# Patient Record
Sex: FEMALE | Race: BLACK OR AFRICAN AMERICAN | NOT HISPANIC OR LATINO | Employment: FULL TIME | URBAN - METROPOLITAN AREA
[De-identification: names, ages, dates, MRNs, and addresses within clinical notes are randomized per-mention and may not be internally consistent; named-entity substitution may affect disease eponyms.]

---

## 2017-08-18 LAB — POC BETA-HCG (QUAL): NEGATIVE

## 2021-04-21 ENCOUNTER — HISTORICAL (OUTPATIENT)
Dept: ADMINISTRATIVE | Facility: HOSPITAL | Age: 46
End: 2021-04-21

## 2022-04-07 ENCOUNTER — HISTORICAL (OUTPATIENT)
Dept: ADMINISTRATIVE | Facility: HOSPITAL | Age: 47
End: 2022-04-07
Payer: COMMERCIAL

## 2022-04-24 VITALS
OXYGEN SATURATION: 99 % | WEIGHT: 186.31 LBS | SYSTOLIC BLOOD PRESSURE: 136 MMHG | HEIGHT: 65 IN | BODY MASS INDEX: 31.04 KG/M2 | DIASTOLIC BLOOD PRESSURE: 87 MMHG

## 2022-05-11 ENCOUNTER — TELEPHONE (OUTPATIENT)
Dept: GYNECOLOGY | Facility: CLINIC | Age: 47
End: 2022-05-11
Payer: COMMERCIAL

## 2022-05-11 NOTE — TELEPHONE ENCOUNTER
Please schedule this patient for an earlier appointment regarding persistent vaginal bleeding. Thank you.       ----- Message from Grace Hagan MD sent at 5/10/2022  7:20 PM CDT -----  Regarding: FW: Questions and concers  Contact: Lavern Banegas  Please schedule this patient for an earlier appointment regarding persistent vaginal bleeding  ----- Message -----  From: Holly Stroud MA  Sent: 5/10/2022   9:16 AM CDT  To: Grace Hagan MD  Subject: FW: Questions and concers                          ----- Message -----  From: Luiza Villanueva  Sent: 5/10/2022   7:39 AM CDT  To: [facility] Gynecology Clinical Support Staff  Subject: Questions and concers                            Patient called today because she is very concerned about constant spotting that started on April 4th that has not stopped. Last week patient stated that she was bleeding heavier and she assumes that it was her cycle but is back to constant on again off again bleeding and spotting.   Patient is very upset that she has not gotten a call back from anyone about this matter and would like to speak with a NP, DR or someone because she is worried about what is going on.   Patient stated that she is off work tomorrow all day and that if you do not reach her to please leave a message so she can call back or with the information. She has an appt with GYN on 07/13/22.  Please advise

## 2022-05-11 NOTE — TELEPHONE ENCOUNTER
Patient called. Spoke with the patient. The patient stated that she has been bleeding for 1 month. The patient noticed last week that the bleeding was a brownish color. The patient stated that this is not normal and she is concerned. The patient has been informed that we are trying to get her an earlier appointment. Patient verbalized understanding.

## 2022-05-20 RX ORDER — AMLODIPINE BESYLATE 10 MG/1
10 TABLET ORAL DAILY
COMMUNITY
Start: 2022-03-25 | End: 2022-05-20 | Stop reason: SDUPTHER

## 2022-05-20 RX ORDER — IBUPROFEN 800 MG/1
800 TABLET ORAL EVERY 6 HOURS PRN
COMMUNITY
Start: 2022-05-11 | End: 2022-05-30

## 2022-05-20 RX ORDER — AMLODIPINE BESYLATE 10 MG/1
10 TABLET ORAL DAILY
Qty: 90 TABLET | Refills: 1 | Status: SHIPPED | OUTPATIENT
Start: 2022-05-20 | End: 2022-05-26 | Stop reason: SDUPTHER

## 2022-05-20 NOTE — TELEPHONE ENCOUNTER
----- Message from Noreen Camara sent at 5/20/2022  9:51 AM CDT -----  Regarding: BP refill  Pt is calling for a refill of her BP meds Please Advise Thanks

## 2022-05-20 NOTE — TELEPHONE ENCOUNTER
Patient needs appt. Please have her schedule one with me next available.   Refills sent to pharmacy of [place] [id number]

## 2022-05-26 RX ORDER — AMLODIPINE BESYLATE 10 MG/1
10 TABLET ORAL DAILY
Qty: 90 TABLET | Refills: 1 | Status: SHIPPED | OUTPATIENT
Start: 2022-05-26 | End: 2022-05-30 | Stop reason: SDUPTHER

## 2022-05-26 RX ORDER — AMLODIPINE BESYLATE 10 MG/1
10 TABLET ORAL DAILY
Qty: 90 TABLET | Refills: 1 | Status: SHIPPED | OUTPATIENT
Start: 2022-05-26 | End: 2022-05-26 | Stop reason: SDUPTHER

## 2022-05-26 NOTE — TELEPHONE ENCOUNTER
Patient presented to the clinic for a refill on Norvasc. She states that she went to her pharmacy and was informed that they never received the medications. Please re-send. Thanks in advance for your attention with this matter.

## 2022-05-30 ENCOUNTER — OFFICE VISIT (OUTPATIENT)
Dept: GYNECOLOGY | Facility: CLINIC | Age: 47
End: 2022-05-30
Payer: COMMERCIAL

## 2022-05-30 VITALS
BODY MASS INDEX: 31.81 KG/M2 | SYSTOLIC BLOOD PRESSURE: 131 MMHG | DIASTOLIC BLOOD PRESSURE: 83 MMHG | RESPIRATION RATE: 18 BRPM | HEIGHT: 65 IN | WEIGHT: 190.94 LBS | HEART RATE: 75 BPM | TEMPERATURE: 99 F | OXYGEN SATURATION: 99 %

## 2022-05-30 DIAGNOSIS — N93.9 ABNORMAL UTERINE BLEEDING (AUB): ICD-10-CM

## 2022-05-30 DIAGNOSIS — Z12.31 VISIT FOR SCREENING MAMMOGRAM: ICD-10-CM

## 2022-05-30 DIAGNOSIS — L30.9 DERMATITIS: ICD-10-CM

## 2022-05-30 DIAGNOSIS — I10 HYPERTENSION, UNSPECIFIED TYPE: Primary | ICD-10-CM

## 2022-05-30 DIAGNOSIS — R73.03 PREDIABETES: ICD-10-CM

## 2022-05-30 PROCEDURE — 99214 PR OFFICE/OUTPT VISIT, EST, LEVL IV, 30-39 MIN: ICD-10-PCS | Mod: S$PBB,,, | Performed by: FAMILY MEDICINE

## 2022-05-30 PROCEDURE — 99213 OFFICE O/P EST LOW 20 MIN: CPT | Mod: PBBFAC | Performed by: FAMILY MEDICINE

## 2022-05-30 PROCEDURE — 99214 OFFICE O/P EST MOD 30 MIN: CPT | Mod: S$PBB,,, | Performed by: FAMILY MEDICINE

## 2022-05-30 RX ORDER — AMLODIPINE BESYLATE 10 MG/1
10 TABLET ORAL DAILY
Qty: 90 TABLET | Refills: 3 | Status: SHIPPED | OUTPATIENT
Start: 2022-05-30 | End: 2023-10-11 | Stop reason: SDUPTHER

## 2022-05-30 RX ORDER — KETOCONAZOLE 20 MG/G
CREAM TOPICAL DAILY
Qty: 30 G | Refills: 0 | Status: SHIPPED | OUTPATIENT
Start: 2022-05-30 | End: 2022-07-20 | Stop reason: ALTCHOICE

## 2022-05-30 RX ORDER — HYDROCORTISONE 25 MG/G
CREAM TOPICAL 2 TIMES DAILY
Qty: 30 G | Refills: 0 | Status: SHIPPED | OUTPATIENT
Start: 2022-05-30 | End: 2022-07-20 | Stop reason: ALTCHOICE

## 2022-05-30 RX ORDER — IBUPROFEN 600 MG/1
600 TABLET ORAL 3 TIMES DAILY
Qty: 30 TABLET | Refills: 0 | Status: SHIPPED | OUTPATIENT
Start: 2022-05-30 | End: 2024-01-23

## 2022-05-30 NOTE — PROGRESS NOTES
Corrientsanket Banegas  05/30/2022  [id number]              Visit Type:a scheduled routine follow-up visit    Chief Complaint:  Follow up  Bleeding x 2 months    History of Present Illness:  Patient is 46 y F with PMH HTN, prediabetes, AUB that presents for follow up  Complaining of bleeding x 2 months. Has nexplanon. Had clots x1. Reports having to wear  2 pads occasionally  Patient requesting a diffent cream for intertrigo. Has not followed with Derm yet. Uses nystatin and triamcinolone      History:  Past Medical History:   Diagnosis Date    Hypertension      Past Surgical History:   Procedure Laterality Date    HERNIA REPAIR      INTRAUTERINE DEVICE INSERTION  2017    Nexplanon 68 inserted 2017 by [facility]     Family History   Problem Relation Age of Onset    Hypertension Father     Ovarian cancer Mother      Social History     Socioeconomic History    Marital status:    Tobacco Use    Smoking status: Never Smoker    Smokeless tobacco: Never Used   Substance and Sexual Activity    Alcohol use: Yes     Comment: occassionally    Drug use: Never    Sexual activity: Not Currently     Partners: Male     Birth control/protection: I.U.D.     Social Determinants of Health     Physical Activity: Insufficiently Active    Days of Exercise per Week: 3 days    Minutes of Exercise per Session: 30 min     There is no problem list on file for this patient.    Review of patient's allergies indicates:  No Known Allergies      Medications:  Current Outpatient Medications on File Prior to Visit   Medication Sig Dispense Refill    amLODIPine (NORVASC) 10 MG tablet Take 1 tablet (10 mg total) by mouth once daily. 90 tablet 1    [DISCONTINUED] ibuprofen (ADVIL,MOTRIN) 800 MG tablet Take 800 mg by mouth every 6 (six) hours as needed.       No current facility-administered medications on file prior to visit.       Medications have been reviewed and reconciled with patient at this visit.    Adverse  reactions to current medications reviewed with patient..      Exam:  Wt Readings from Last 3 Encounters:   05/30/22 86.6 kg (190 lb 14.7 oz)   06/23/21 84.5 kg (186 lb 4.6 oz)     Temp Readings from Last 3 Encounters:   05/30/22 98.8 °F (37.1 °C)     BP Readings from Last 3 Encounters:   05/30/22 131/83   06/23/21 136/87     Pulse Readings from Last 3 Encounters:   05/30/22 75     Body mass index is 31.77 kg/m².      ROS:  See HPI for details    All Other ROS: Negative except as stated in HPI.    PE:  General: Alert and oriented, No acute distress.  Head: Normocephalic, Atraumatic.  Eye: Pupils are equal, round and reactive to light, Extraocular movements are intact, Sclera non-icteric.  Ears/Nose/Throat: Normal, Mucosa moist,Clear.  Neck/Thyroid: Supple, Non-tender, No carotid bruit, No palpable thyromegaly or thyroid nodule,   Respiratory: Clear to auscultation bilaterally; No wheezes, rales or rhonchi,Non-labored respirations, Symmetrical chest wall expansion.  Cardiovascular: Regular rate and rhythm, S1/S2 normal, No murmurs, rubs or gallops.  Gastrointestinal: Soft, Non-tender, Non-distended, Normal bowel sounds, No palpable organomegaly.  Musculoskeletal: Normal range of motion.  Integumentary: Warm, Dry, Intact, No suspicious lesions or rashes.  Extremities: No clubbing, cyanosis or edema  Neurologic: No focal deficits, Cranial Nerves II-XII are grossly intact, Motor strength normal upper and lower extremities, Sensory exam intact.  Psychiatric: Normal interaction, Coherent speech, Euthymic mood, Appropriate affect    Laboratory Reviewed ({No)  No results found for: WBC, HGB, HCT, PLT, CHOL, TRIG, HDL, LDLDIRECT, ALT, AST, NA, K, CL, CREATININE, BUN, CO2, TSH, PSA, INR, GLUF, HGBA1C, MICROALBUR    Shontrona was seen today for follow-up.    Diagnoses and all orders for this visit:    Hypertension, unspecified type  -     CBC Auto Differential; Future  -     Comprehensive Metabolic Panel; Future  -      Hemoglobin A1C; Future  -     Lipid Panel; Future  -     TSH; Future  -     Urinalysis, Reflex to Urine Culture Urine, Clean Catch; Future  -     T4, Free; Future    Abnormal uterine bleeding (AUB)  -     CBC Auto Differential; Future  -     Comprehensive Metabolic Panel; Future  -     Hemoglobin A1C; Future  -     Lipid Panel; Future  -     TSH; Future  -     Urinalysis, Reflex to Urine Culture Urine, Clean Catch; Future  -     T4, Free; Future    Dermatitis  -     CBC Auto Differential; Future  -     Comprehensive Metabolic Panel; Future  -     Hemoglobin A1C; Future  -     Lipid Panel; Future  -     TSH; Future  -     Urinalysis, Reflex to Urine Culture Urine, Clean Catch; Future  -     T4, Free; Future    Prediabetes  -     CBC Auto Differential; Future  -     Comprehensive Metabolic Panel; Future  -     Hemoglobin A1C; Future  -     Lipid Panel; Future  -     TSH; Future  -     Urinalysis, Reflex to Urine Culture Urine, Clean Catch; Future  -     T4, Free; Future    Visit for screening mammogram  -     Mammo Digital Diagnostic Bilat with Nilesh; Future    Other orders  -     ibuprofen (ADVIL,MOTRIN) 600 MG tablet; Take 1 tablet (600 mg total) by mouth 3 (three) times daily. For bleeding  -     ketoconazole (NIZORAL) 2 % cream; Apply topically once daily.  -     hydrocortisone 2.5 % cream; Apply topically 2 (two) times daily.          Keep follow up with GYN  Will do a trial of Motrin for the bleeding  Derm referral  Ketoconazole and hydrocortisone sent pharmacy  Declined labs; would like to do next visit  Mammogram ordered  Needs screening FIT  DASH diet, lifestyle modfications      Care Plan/Goals: Reviewed    Goals    None         Follow up: Follow up in about 6 months (around 11/30/2022) for with labs.

## 2022-06-22 ENCOUNTER — OFFICE VISIT (OUTPATIENT)
Dept: GYNECOLOGY | Facility: CLINIC | Age: 47
End: 2022-06-22
Payer: COMMERCIAL

## 2022-06-22 VITALS
OXYGEN SATURATION: 100 % | DIASTOLIC BLOOD PRESSURE: 84 MMHG | SYSTOLIC BLOOD PRESSURE: 127 MMHG | BODY MASS INDEX: 29.07 KG/M2 | RESPIRATION RATE: 18 BRPM | HEART RATE: 77 BPM | WEIGHT: 185.19 LBS | HEIGHT: 67 IN | TEMPERATURE: 98 F

## 2022-06-22 DIAGNOSIS — N84.1 CERVICAL POLYP: Primary | ICD-10-CM

## 2022-06-22 DIAGNOSIS — N93.9 ABNORMAL UTERINE BLEEDING (AUB): ICD-10-CM

## 2022-06-22 DIAGNOSIS — Z12.4 SCREENING FOR CERVICAL CANCER: ICD-10-CM

## 2022-06-22 PROCEDURE — 87591 N.GONORRHOEAE DNA AMP PROB: CPT

## 2022-06-22 PROCEDURE — 87624 HPV HI-RISK TYP POOLED RSLT: CPT

## 2022-06-22 PROCEDURE — 99214 OFFICE O/P EST MOD 30 MIN: CPT | Mod: PBBFAC

## 2022-06-22 PROCEDURE — 87661 TRICHOMONAS VAGINALIS AMPLIF: CPT

## 2022-06-22 PROCEDURE — 87491 CHLMYD TRACH DNA AMP PROBE: CPT | Performed by: STUDENT IN AN ORGANIZED HEALTH CARE EDUCATION/TRAINING PROGRAM

## 2022-06-22 RX ORDER — MEDROXYPROGESTERONE ACETATE 10 MG/1
10 TABLET ORAL DAILY
Qty: 30 TABLET | Refills: 0 | Status: SHIPPED | OUTPATIENT
Start: 2022-06-22 | End: 2022-07-20

## 2022-06-22 NOTE — PROGRESS NOTES
[facility] GYNECOLOGY CLINIC NOTE     Lavern Baneags is a 46 y.o.  presenting to GYN clinic for AUB  States she has been bleeding for past 3 months. Using 2-3 pads per day. Passing small-medium clots, mild cramps. Nexplanon in place, pt does not recall when it was replaced, per chart review replaced in 2021. Pt states she really likes nexplanon despite bleeding profile however would like something to stop bleeding now. Denies CP, SOB, lightheadedness or dizziness.   Pt states inner thigh rash bothersome now that she is exercising again. When last seen by GYN was given triamcinolone with improved sx. Was referred to dermatology but pt declined appointment with them once sx resolved.     Of note pt with hx cervical dysplasia, also had prior biopsy of cervical polyps in 2021, notable for squamous metaplasia. Pap NILM HPV neg at that time.     Pap hx:   2017: ASCUS hrHPV+  2017: scant endocervical cells, squamous metaplasia  2019: NILM, hrHPV+  2019 ECC: squamous metaplaisa and squamous dysplasia; high grade lesion cannot be ruled out  2020: colpo 6:00- superficial squamous ectocervical epithelium; ECC immature squamous epithelium  2021 pap: NILM hrHPV neg  2021 endocervical mass bx: chronic cervicitis and squamous metaplasia    Gynecology  OB History        3    Para   3    Term   3            AB        Living   3       SAB        IAB        Ectopic        Multiple        Live Births   3                Past Medical History:   Diagnosis Date    Abnormal Pap smear of cervix     Anemia     Hypertension       Past Surgical History:   Procedure Laterality Date    HERNIA REPAIR      INTRAUTERINE DEVICE INSERTION      Nexplanon 68 inserted 2017 by [facility]      Current Outpatient Medications   Medication Instructions    amLODIPine (NORVASC) 10 mg, Oral, Daily    hydrocortisone 2.5 % cream Topical (Top), 2 times daily    ibuprofen (ADVIL,MOTRIN) 600 mg,  "Oral, 3 times daily, For bleeding    ketoconazole (NIZORAL) 2 % cream Topical (Top), Daily     Social History     Tobacco Use    Smoking status: Never Smoker    Smokeless tobacco: Never Used   Substance Use Topics    Alcohol use: Yes     Comment: occassionally    Drug use: Never       Review of Systems  Pertinent items are noted in HPI.     Objective:     /84 (BP Location: Left arm)   Pulse 77   Temp 97.9 °F (36.6 °C) (Oral)   Resp 18   Ht 5' 7" (1.702 m)   Wt 84 kg (185 lb 3 oz)   LMP  (LMP Unknown)   SpO2 100%   BMI 29.00 kg/m²   Physical Exam:  Gen: Well-nourished, well-developed female appearing stated age. Alert, cooperative, in no acute distress.  CV: regular rate  Chest: unlabored WOB  Abdomen: Soft, non-tender, no masses.  External genitalia: Bilateral hyperpigmentation and skin thickening at inner thigh consistent with intertrigo. No discrete vulvar lesions.  Speculum Exam: Vaginal vault dark blood in vault. Fleshy endocervical polyps protruding from external os, friable.   Bimanual Exam: No cervical motion tenderness.  Uterus freely mobile.  Normal size uterus. No adnexal masses.  Nontender exam.   Note: RN chaperone present for entirety of exam.        Assessment:       46 y.o.  here for AUB in setting of nexplanon use, cervical polyp. Repeat pap smear collected today.   1. Cervical polyp  US Pelvis Comp with Transvag NON-OB (xpd   2. Screening for cervical cancer  Liquid-Based Pap Smear, Screening Screening   3. Abnormal uterine bleeding (AUB)  medroxyPROGESTERone (PROVERA) 10 MG tablet          Plan:     Problem List Items Addressed This Visit        Renal/    Cervical polyp - Primary     Pedunculated endocervical polyp, approx 0.5cm. Possible source of abnormal bleeding. Given recurrence after excision one year ago repeat biopsy indicated. Will schedule for in office hysteroscopy with possible polypectomy. TVUS ordered to further evaluate polyps.            Relevant Orders "    US Pelvis Comp with Transvag NON-OB (xpd    Abnormal uterine bleeding (AUB)     Nexplanon placed in 1/2021, pt with consistent bleeding for 3 months. Of note pt with persistent bleeding in the past with prior nexplanons. Pt declines removal of nexplanon with different method. Given age >35 and HTN OCP trial not reasonable option. Will trial provera 10mg for one month, re-discuss method if bleeding persists and remains bothersome to patient.            Relevant Medications    medroxyPROGESTERone (PROVERA) 10 MG tablet      Other Visit Diagnoses     Screening for cervical cancer        Relevant Orders    Liquid-Based Pap Smear, Screening Screening           Return to clinic in 3 weeks for polyp biopsy, possible nexplanon removal    Discussed patient and plan with Dr. Provost Kathy Abdi MD, MPH  [facility], PGY2

## 2022-06-23 NOTE — ASSESSMENT & PLAN NOTE
Pedunculated endocervical polyp, approx 0.5cm. Possible source of abnormal bleeding. Given recurrence after excision one year ago repeat biopsy indicated. TVUS ordered to further evaluate polyps.

## 2022-06-23 NOTE — ASSESSMENT & PLAN NOTE
Nexplanon placed in 1/2021, pt with consistent bleeding for 3 months. Of note pt with persistent bleeding in the past with prior nexplanons. Pt declines removal of nexplanon with different method. Given age >35 and HTN OCP trial not reasonable option. Will trial provera 10mg for one month, re-discuss method if bleeding persists and remains bothersome to patient.

## 2022-06-29 ENCOUNTER — HOSPITAL ENCOUNTER (OUTPATIENT)
Dept: RADIOLOGY | Facility: HOSPITAL | Age: 47
Discharge: HOME | End: 2022-06-29
Attending: FAMILY MEDICINE
Payer: COMMERCIAL

## 2022-06-29 DIAGNOSIS — Z12.31 VISIT FOR SCREENING MAMMOGRAM: ICD-10-CM

## 2022-06-29 PROCEDURE — 77067 SCR MAMMO BI INCL CAD: CPT | Mod: TC

## 2022-06-29 PROCEDURE — 77063 BREAST TOMOSYNTHESIS BI: CPT | Mod: 26,,, | Performed by: RADIOLOGY

## 2022-06-29 PROCEDURE — 77067 SCR MAMMO BI INCL CAD: CPT | Mod: 26,,, | Performed by: RADIOLOGY

## 2022-06-29 PROCEDURE — 77067 MAMMO DIGITAL SCREENING BILAT WITH TOMO: ICD-10-PCS | Mod: 26,,, | Performed by: RADIOLOGY

## 2022-06-29 PROCEDURE — 77063 MAMMO DIGITAL SCREENING BILAT WITH TOMO: ICD-10-PCS | Mod: 26,,, | Performed by: RADIOLOGY

## 2022-07-05 LAB
INSULIN SERPL-ACNC: ABNORMAL U[IU]/ML
LAB AP BETHESDA CATEGORY: ABNORMAL
LAB AP GYN MOLECULAR TESTING: ABNORMAL
LAB AP LMP DATE: ABNORMAL
LAB AP OCHS PAP SPECIMEN ADEQUACY: ABNORMAL
LAB AP OHS PAP INTERPRETATION: ABNORMAL
LAB AP PAP DISCLAIMER COMMENTS: ABNORMAL
LAB AP PAP ESTROGEN REPLACEMENT THERAPY: ABNORMAL

## 2022-07-08 ENCOUNTER — HOSPITAL ENCOUNTER (OUTPATIENT)
Dept: RADIOLOGY | Facility: HOSPITAL | Age: 47
Discharge: HOME | End: 2022-07-08
Attending: STUDENT IN AN ORGANIZED HEALTH CARE EDUCATION/TRAINING PROGRAM
Payer: COMMERCIAL

## 2022-07-08 DIAGNOSIS — N84.1 CERVICAL POLYP: ICD-10-CM

## 2022-07-08 PROCEDURE — 76830 TRANSVAGINAL US NON-OB: CPT | Mod: TC

## 2022-07-12 ENCOUNTER — TELEPHONE (OUTPATIENT)
Dept: GYNECOLOGY | Facility: CLINIC | Age: 47
End: 2022-07-12
Payer: COMMERCIAL

## 2022-07-12 NOTE — TELEPHONE ENCOUNTER
----- Message from Luiza Villanueva sent at 7/12/2022  8:36 AM CDT -----  Regarding: results  Contact: Lavern portillo  Patient called today asking about her results from her US on Friday, also patient is still bleeding and the medication that was given to her is not helping. Patient stated that if she doesn't answer please leave a message with a good call time to speak with some one.

## 2022-07-12 NOTE — TELEPHONE ENCOUNTER
Please advise, patient states she is still bleeding on provera 10 mg 1 tab daily. She is requesting results of US.

## 2022-07-19 ENCOUNTER — TELEPHONE (OUTPATIENT)
Dept: GYNECOLOGY | Facility: CLINIC | Age: 47
End: 2022-07-19
Payer: COMMERCIAL

## 2022-07-19 NOTE — TELEPHONE ENCOUNTER
Patient was called to give colposcopy appt and was not sure why, looked into issue.  Dr Castaneda has a note that she called patient and left message.  Patient states that she has no voicemails on her phone from today, she sees phone calls but she states that she doesn't have any messages in her voicemail.  I tried to explain that she has appt on 7/29 for her issue for abnormal bleeding/nexplanon/wants hyst but has a new issue with an abnormal pap from 6/22 and needs colposcopyy( which is what Dr Castaneda wanted to discuss with her).  Patient states that she's had colposcopies before and we keep doing them and she doesn't know why, I attempted to explain.  She is frustrated with her vaginal bleeding at this point and feels that she has not made an progress with our clinic on that issue.  I will talk with Dr Castaneda tomorrow and see if she can call patient tomorrow between patients.

## 2022-07-19 NOTE — TELEPHONE ENCOUNTER
MD phone call. No answer. Left message. Patient to have a colposcopy on 7/29 and evaluation of AUB, may remove nexplanon, and patient with polyp biopsy.  Chad Castaneda MD PGY2  Obstetrics & Gynecology

## 2022-07-20 ENCOUNTER — OFFICE VISIT (OUTPATIENT)
Dept: GYNECOLOGY | Facility: CLINIC | Age: 47
End: 2022-07-20
Payer: COMMERCIAL

## 2022-07-20 VITALS
TEMPERATURE: 99 F | HEIGHT: 66 IN | RESPIRATION RATE: 20 BRPM | OXYGEN SATURATION: 100 % | WEIGHT: 180 LBS | HEART RATE: 71 BPM | BODY MASS INDEX: 28.93 KG/M2 | DIASTOLIC BLOOD PRESSURE: 77 MMHG | SYSTOLIC BLOOD PRESSURE: 118 MMHG

## 2022-07-20 DIAGNOSIS — N93.9 ABNORMAL UTERINE BLEEDING (AUB): Primary | ICD-10-CM

## 2022-07-20 PROCEDURE — 99213 OFFICE O/P EST LOW 20 MIN: CPT | Mod: PBBFAC

## 2022-07-20 RX ORDER — NORETHINDRONE 5 MG/1
5 TABLET ORAL DAILY
Qty: 30 TABLET | Refills: 11 | Status: SHIPPED | OUTPATIENT
Start: 2022-07-20 | End: 2023-10-16

## 2022-07-20 NOTE — PROGRESS NOTES
[facility] GYNECOLOGY CLINIC NOTE     Lavern Banegas is a 46 y.o.  with PMH of HTN presenting to GYN clinic for AUB.  States she has been bleeding for past 3 months. Using 2-3 pads per day. Passing small-medium clots, mild cramps. Nexplanon in place, per chart review replaced in 2021. Denies CP, SOB, lightheadedness or dizziness. She was started on Provera 10mg daily at last visit and pt reports daily bleeding has improved somewhat, however she desires hysterectomy for definitive management.     Of note, pt has ASCUS/HRHPV positive with endocervical polyps on exam - due for colposcopy and cervical biopsy.     Pap hx:   2017: ASCUS hrHPV+  2017: scant endocervical cells, squamous metaplasia  2019: NILM, hrHPV+  2019 ECC: squamous metaplaisa and squamous dysplasia; high grade lesion cannot be ruled out  2020: colpo 6:00- superficial squamous ectocervical epithelium; ECC immature squamous epithelium  2021 pap: NILM hrHPV neg  2021 endocervical mass bx: chronic cervicitis and squamous metaplasia  2022 ASCUS, HRHPV POSITIVE     Gynecology  OB History        3    Para   3    Term   3            AB        Living   3       SAB        IAB        Ectopic        Multiple        Live Births   3                Past Medical History:   Diagnosis Date    Abnormal Pap smear of cervix     Anemia     Hypertension       Past Surgical History:   Procedure Laterality Date    HERNIA REPAIR      INTRAUTERINE DEVICE INSERTION      Nexplanon 68 inserted 2017 by [facility]    Nexplanon insertion N/A 2020    Performed at [facility], got specifics from nurse there.      Current Outpatient Medications   Medication Instructions    amLODIPine (NORVASC) 10 mg, Oral, Daily    ibuprofen (ADVIL,MOTRIN) 600 mg, Oral, 3 times daily, For bleeding    norethindrone (AYGESTIN) 5 mg, Oral, Daily     Social History     Tobacco Use    Smoking status: Never Smoker    Smokeless tobacco:  "Never Used   Substance Use Topics    Alcohol use: Yes     Comment: occassionally    Drug use: Never       Review of Systems  Pertinent items are noted in HPI.     Objective:     /77   Pulse 71   Temp 99 °F (37.2 °C)   Resp 20   Ht 5' 6" (1.676 m)   Wt 81.6 kg (180 lb)   LMP  (LMP Unknown) Comment: also on po provera  SpO2 100%   BMI 29.05 kg/m²   Physical Exam:  Gen: Well-nourished, well-developed female appearing stated age. Alert, cooperative, in no acute distress.  HEENT: No thyromegaly, goiter, or masses  CV: rrr, no murmurs/rubs/gallops  Chest: ctabl, no wheezes/rales/rhonchi  Abdomen: Soft, non-tender, no masses.  Extrem: Extremities normal, atraumatic, non-tender calves.    Note: RN chaperone present for entirety of exam.    Relevant Labs:         Relevant Imaging:  CLINICAL HISTORY:  Polyp of cervix uteri     TECHNIQUE:  Transabdominal sonography of the pelvis was performed, followed by transvaginal sonography to better evaluate the uterus and ovaries.     FINDINGS:  Uterus:     Size: 9.4 x 4.6 x 4.3 cm     Masses: Small fibroid is identified anteriorly measuring 1.5 x 1.5 x 1.5 cm.  A hypoechoic area in the cervical region measures 7 mm x 5 mm x 6 mm nature which is difficult to be ascertained by this exam     Endometrium: Endometrial stripe is identified measuring approximately 4 mm with minimal amount of fluid     Right ovary:     Not visualized due to bowel gas     Left ovary:     Size:  cm     Not visualized due to bowel gas     Free Fluid:     Small amount of fluid identified in the cul-de-sac in by the right adnexa     Impression:     Uterine fibroid as above.     Endometrial stripe not well defined but measuring approximately 4 mm.     Hypoechoic lesion within the cervical region measuring 7 mm x 5 mm x 6 mm exact etiology difficult to ascertain by this exam        Electronically signed by: Dexter Guzman  Date:                                            07/08/2022  Time:           "                                 15:20      Assessment:       46 y.o.  here for AUB follow up, with known h/o cervical dysplasia and cervical polyps.     Pt desires hysterectomy for definitive management. Emphasized to pt importance of colposcopy to rule out high grade cervical lesion or cervical cancer, and that she may need excisional procedure prior to hysterectomy.     1. Abnormal uterine bleeding (AUB)  norethindrone (AYGESTIN) 5 mg Tab          Plan:     -- Stop Provera and start Aygestin for management of irregular bleeding - informed pt that while polyps are present, intermenstrual spotting may continue.     Problem List Items Addressed This Visit        Renal/    Abnormal uterine bleeding (AUB) - Primary    Relevant Medications    norethindrone (AYGESTIN) 5 mg Tab           Return to clinic for scheduled colposcopy/polypectomy.     Discussed patient and plan with Dr. Park,     Brandee Herman MD   [facility] PGY4

## 2022-07-26 LAB
CHLAMYDIA TRACHOMATIS (PRECISION): NEGATIVE
HPV16+18+H RISK 12 DNA CVX-IMP: POSITIVE
NEISSERIA GONORRHOEAE (PRECISION): NEGATIVE
TRICHOMONAS VAGINALIS (PRECISION): NEGATIVE

## 2022-07-26 NOTE — TELEPHONE ENCOUNTER
Spoke to patient and advised to start provera bid, verbalized understanding. Patient states she did not start yet due to pharmacy not having it ready on the day she went. She did agree to start today. She also stated she is going through 4 pads daily and every time she urinated there is blood in toilet. Patient would like to know if you still want her to come in on 7/29 or keep appt n August. Please advise

## 2022-08-12 ENCOUNTER — PROCEDURE VISIT (OUTPATIENT)
Dept: GYNECOLOGY | Facility: CLINIC | Age: 47
End: 2022-08-12
Payer: COMMERCIAL

## 2022-08-12 VITALS
HEIGHT: 66 IN | BODY MASS INDEX: 28.49 KG/M2 | SYSTOLIC BLOOD PRESSURE: 127 MMHG | TEMPERATURE: 99 F | WEIGHT: 177.25 LBS | RESPIRATION RATE: 18 BRPM | HEART RATE: 75 BPM | OXYGEN SATURATION: 99 % | DIASTOLIC BLOOD PRESSURE: 82 MMHG

## 2022-08-12 DIAGNOSIS — N84.1 POLYP AT CERVICAL OS: ICD-10-CM

## 2022-08-12 DIAGNOSIS — N93.9 ABNORMAL UTERINE BLEEDING (AUB): ICD-10-CM

## 2022-08-12 DIAGNOSIS — R87.619 ABNORMAL CERVICAL PAPANICOLAOU SMEAR, UNSPECIFIED ABNORMAL PAP FINDING: Primary | ICD-10-CM

## 2022-08-12 LAB
B-HCG UR QL: NEGATIVE
CTP QC/QA: YES

## 2022-08-12 PROCEDURE — 57500 BIOPSY OF CERVIX: CPT | Mod: PBBFAC

## 2022-08-12 PROCEDURE — 81025 URINE PREGNANCY TEST: CPT | Mod: PBBFAC

## 2022-08-12 PROCEDURE — 57505 ENDOCERVICAL CURETTAGE: CPT | Mod: PBBFAC

## 2022-08-12 PROCEDURE — 57456 ENDOCERV CURETTAGE W/SCOPE: CPT | Mod: PBBFAC

## 2022-08-12 NOTE — LETTER
August 12, 2022      [facility]  [address] [facility] [address]  Phone: [phone]       Patient: Lavern Banegas   YOB: 1975  Date of Visit: 08/12/2022    To Whom It May Concern:    Alea Banegas  was at [facility] on 08/12/2022. The patient may return to work on 8/12/22 with no restrictions. If you have any questions or concerns, or if I can be of further assistance, please do not hesitate to contact me.    Sincerely,    Dr. Marty Martinez

## 2022-08-12 NOTE — PROCEDURES
Colposcopy    Date/Time: 8/12/2022 10:00 AM  Performed by: Chad Castaneda MD  Authorized by: Chon Peña Sr., MD     Consent Done?:  Yes (Verbal) and Yes (Written)  Timeout:Immediately prior to procedure a time out was called to verify the correct patient, procedure, equipment, support staff and site/side marked as required  Prep:Patient was prepped and draped in the usual sterile fashion    Colposcopy Site:  Cervix  Position:  Supine  Acrowhite Lesion: No    Atypical Vessels: No    Transformation Zone Adequate?: No    Biopsy?: Yes         Colposcopy Biopsy Sites: Endocervical polyp excised and removed.  ECC Performed?: Yes    Estimated blood loss (cc):  5   Patient tolerated the procedure well with no immediate complications.   Post-operative instructions were provided for the patient.   Patient was discharged and will follow up if any complications occur     Endocervical polyp noted at external os. Noted to have 3 polyps extruding from cervix. Patient was consented for polypectomy. Risks/Benefits were discussed with patient. Written and verbal consent were obtained. Beefy red polyp noted to be friable. Polyp was excised using ring forceps, and sent to pathology. Hemostasis was noted. Will follow up with pathology.             Chad Castaneda MD PGY2  Obstetrics & Gynecology   08/12/2022

## 2022-08-16 LAB
ESTROGEN SERPL-MCNC: NORMAL PG/ML
INSULIN SERPL-ACNC: NORMAL U[IU]/ML
LAB AP CLINICAL INFORMATION: NORMAL
LAB AP GROSS DESCRIPTION: NORMAL
LAB AP REPORT FOOTNOTES: NORMAL
T3RU NFR SERPL: NORMAL %

## 2022-08-23 ENCOUNTER — TELEPHONE (OUTPATIENT)
Dept: GYNECOLOGY | Facility: CLINIC | Age: 47
End: 2022-08-23
Payer: COMMERCIAL

## 2022-08-23 NOTE — TELEPHONE ENCOUNTER
Called patient. No answer. Left voicemail. Will call again.  Chad Castaneda MD PGY2  Obstetrics & Gynecology

## 2022-09-02 ENCOUNTER — TELEPHONE (OUTPATIENT)
Dept: GYNECOLOGY | Facility: CLINIC | Age: 47
End: 2022-09-02

## 2022-09-02 NOTE — TELEPHONE ENCOUNTER
----- Message from Elsie Maravilla sent at 9/2/2022  9:27 AM CDT -----  Regarding: results  Patient requesting results on the colpo procedure she done 2 weeks ago.

## 2022-09-06 ENCOUNTER — TELEPHONE (OUTPATIENT)
Dept: GYNECOLOGY | Facility: CLINIC | Age: 47
End: 2022-09-06
Payer: COMMERCIAL

## 2022-09-06 NOTE — TELEPHONE ENCOUNTER
Left message for a third time inquiring for MAR on date that nexplanon was inserted    Called patient and let her know that [facility] has not returned my call with nexplanon insertion date.  Explained that it may be easier for her to go over to health unit and get a copy of MAR on date that nexplanon was inserted and bring it here for scanning.  Will send Message to Dr Cornelius for her to call patient with her colpo results.

## 2022-09-07 ENCOUNTER — PATIENT MESSAGE (OUTPATIENT)
Dept: GYNECOLOGY | Facility: CLINIC | Age: 47
End: 2022-09-07
Payer: COMMERCIAL

## 2022-09-07 ENCOUNTER — TELEPHONE (OUTPATIENT)
Dept: GYNECOLOGY | Facility: CLINIC | Age: 47
End: 2022-09-07
Payer: COMMERCIAL

## 2022-09-07 NOTE — TELEPHONE ENCOUNTER
Attempted to reach pt to discuss colpo results however no answer x 2, left voicemail. Will send results via patient message.    Prerna Cornelius MD   [facility] PGY-2

## 2022-09-09 ENCOUNTER — TELEPHONE (OUTPATIENT)
Dept: GYNECOLOGY | Facility: CLINIC | Age: 47
End: 2022-09-09
Payer: COMMERCIAL

## 2022-09-15 ENCOUNTER — HISTORICAL (OUTPATIENT)
Dept: ADMINISTRATIVE | Facility: HOSPITAL | Age: 47
End: 2022-09-15
Payer: COMMERCIAL

## 2023-10-11 ENCOUNTER — OFFICE VISIT (OUTPATIENT)
Dept: URGENT CARE | Facility: CLINIC | Age: 48
End: 2023-10-11
Payer: COMMERCIAL

## 2023-10-11 VITALS
WEIGHT: 190.06 LBS | DIASTOLIC BLOOD PRESSURE: 88 MMHG | BODY MASS INDEX: 31.67 KG/M2 | RESPIRATION RATE: 18 BRPM | SYSTOLIC BLOOD PRESSURE: 135 MMHG | TEMPERATURE: 99 F | OXYGEN SATURATION: 100 % | HEART RATE: 79 BPM | HEIGHT: 65 IN

## 2023-10-11 DIAGNOSIS — I10 HYPERTENSION, UNSPECIFIED TYPE: Primary | ICD-10-CM

## 2023-10-11 DIAGNOSIS — Z76.89 REFERRAL OF PATIENT: ICD-10-CM

## 2023-10-11 DIAGNOSIS — I10 PRIMARY HYPERTENSION: ICD-10-CM

## 2023-10-11 PROCEDURE — 99213 OFFICE O/P EST LOW 20 MIN: CPT | Mod: S$PBB,,,

## 2023-10-11 PROCEDURE — 99213 PR OFFICE/OUTPT VISIT, EST, LEVL III, 20-29 MIN: ICD-10-PCS | Mod: S$PBB,,,

## 2023-10-11 PROCEDURE — 99214 OFFICE O/P EST MOD 30 MIN: CPT | Mod: PBBFAC

## 2023-10-11 RX ORDER — AMLODIPINE BESYLATE 10 MG/1
10 TABLET ORAL DAILY
Qty: 30 TABLET | Refills: 0 | Status: SHIPPED | OUTPATIENT
Start: 2023-10-11 | End: 2024-01-23

## 2023-10-11 RX ORDER — AMLODIPINE BESYLATE 10 MG/1
10 TABLET ORAL DAILY
Qty: 90 TABLET | Refills: 3 | Status: SHIPPED | OUTPATIENT
Start: 2023-10-11 | End: 2023-10-11

## 2023-10-12 NOTE — PROGRESS NOTES
"  Urgent Care     Patient ID: [id number]     Chief Complaint: Medication Refill (norvasc) and Referral (NEEDS PCP FOR HTN MANAGEMENT)      HPI:     Lavern Banegas is a 47 y.o. female here today for refill of Norvasc. Reports has no PCP , and has  been out of Norvasc for 2 days. Denies HA, chest pain, or dizziness. Requesting PCP referral.     Past Medical History:   Diagnosis Date    Abnormal Pap smear of cervix     Anemia     Hypertension         Past Surgical History:   Procedure Laterality Date    HERNIA REPAIR      INTRAUTERINE DEVICE INSERTION  2017    Nexplanon 68 inserted 2017 by [facility]    Nexplanon insertion N/A 12/09/2020    Performed at [facility], got specifics from nurse there.        Social History     Tobacco Use    Smoking status: Never    Smokeless tobacco: Never   Substance and Sexual Activity    Alcohol use: Yes     Comment: occassionally    Drug use: Never    Sexual activity: Not Currently     Partners: Male     Birth control/protection: Implant        Current Outpatient Medications   Medication Instructions    amLODIPine (NORVASC) 10 mg, Oral, Daily    ibuprofen (ADVIL,MOTRIN) 600 mg, Oral, 3 times daily, For bleeding    norethindrone (AYGESTIN) 5 mg, Oral, Daily       Review of patient's allergies indicates:  No Known Allergies     Patient Care Team:  No, Primary Doctor as PCP - General     Subjective:     Review of Systems   Constitutional: Negative.    HENT: Negative.     Eyes: Negative.    Respiratory: Negative.     Cardiovascular: Negative.    Gastrointestinal: Negative.    Genitourinary: Negative.    Musculoskeletal: Negative.    Skin: Negative.    Allergic/Immunologic: Negative.    Neurological: Negative.    Hematological: Negative.    Psychiatric/Behavioral: Negative.         Objective:     Visit Vitals  /88   Pulse 79   Temp 98.9 °F (37.2 °C)   Resp 18   Ht 5' 5" (1.651 m)   Wt 86.2 kg (190 lb 0.6 oz)   SpO2 100%   BMI 31.62 kg/m²       Physical " Exam  Constitutional:       Appearance: Normal appearance.   HENT:      Head: Normocephalic.      Right Ear: A middle ear effusion is present.      Left Ear: A middle ear effusion is present.   Eyes:      General: Lids are normal.   Cardiovascular:      Rate and Rhythm: Normal rate and regular rhythm.   Pulmonary:      Effort: Pulmonary effort is normal.      Breath sounds: Normal breath sounds and air entry.   Abdominal:      General: Abdomen is flat.   Musculoskeletal:      Cervical back: Full passive range of motion without pain and normal range of motion.   Skin:     General: Skin is cool.   Neurological:      Mental Status: She is alert.   Psychiatric:         Attention and Perception: Attention normal.         Speech: Speech normal.         Behavior: Behavior normal.         Thought Content: Thought content normal.         Cognition and Memory: Cognition normal.         Judgment: Judgment normal.         Assessment:       ICD-10-CM ICD-9-CM   1. Hypertension, unspecified type  I10 401.9   2. Referral of patient  Z76.89 V68.89        Plan:     1. Hypertension, unspecified type  -     amLODIPine (NORVASC) 10 MG tablet; Take 1 tablet (10 mg total) by mouth once daily.  Dispense: 90 tablet; Refill: 3    2. Referral of patient     Follow up with [facility] to establish a PCP . Keep a log of daily blood pressure readings. Maintain a low salt diet.   HALIE Bo

## 2023-10-16 ENCOUNTER — OFFICE VISIT (OUTPATIENT)
Dept: GYNECOLOGY | Facility: CLINIC | Age: 48
End: 2023-10-16
Payer: COMMERCIAL

## 2023-10-16 VITALS
DIASTOLIC BLOOD PRESSURE: 87 MMHG | OXYGEN SATURATION: 100 % | RESPIRATION RATE: 20 BRPM | BODY MASS INDEX: 31.69 KG/M2 | TEMPERATURE: 99 F | HEART RATE: 81 BPM | SYSTOLIC BLOOD PRESSURE: 134 MMHG | WEIGHT: 190.19 LBS | HEIGHT: 65 IN

## 2023-10-16 DIAGNOSIS — Z12.31 VISIT FOR SCREENING MAMMOGRAM: ICD-10-CM

## 2023-10-16 DIAGNOSIS — Z30.40 SURVEILLANCE OF CONTRACEPTIVE IMPLANT: ICD-10-CM

## 2023-10-16 DIAGNOSIS — Z12.11 COLON CANCER SCREENING: ICD-10-CM

## 2023-10-16 DIAGNOSIS — Z12.4 PAP SMEAR FOR CERVICAL CANCER SCREENING: Primary | ICD-10-CM

## 2023-10-16 DIAGNOSIS — Z76.89 ENCOUNTER TO ESTABLISH CARE: ICD-10-CM

## 2023-10-16 PROCEDURE — 99396 PR PREVENTIVE VISIT,EST,40-64: ICD-10-PCS | Mod: S$PBB,,, | Performed by: NURSE PRACTITIONER

## 2023-10-16 PROCEDURE — 87625 HPV TYPES 16 & 18 ONLY: CPT

## 2023-10-16 PROCEDURE — 99396 PREV VISIT EST AGE 40-64: CPT | Mod: S$PBB,,, | Performed by: NURSE PRACTITIONER

## 2023-10-16 PROCEDURE — 99214 OFFICE O/P EST MOD 30 MIN: CPT | Mod: PBBFAC | Performed by: NURSE PRACTITIONER

## 2023-10-16 PROCEDURE — 87624 HPV HI-RISK TYP POOLED RSLT: CPT

## 2023-10-16 PROCEDURE — 88174 CYTOPATH C/V AUTO IN FLUID: CPT | Performed by: NURSE PRACTITIONER

## 2023-10-16 NOTE — PROGRESS NOTES
Subjective:       Patient ID: Lavern Banegas is a 47 y.o. female.    Chief Complaint:  Gynecologic Exam      History of Present Illness  The patient  here for annual exam. Pt has been followed by GYN. Her LMP was 4-5 months ago. Prior to that cycle, had not had one since 2022. Admits history of abnormal paps (see bleow). Pt had colpo with endocervical polyp removal in 2022 with GYN. Previous endocervical bx in . MG-22 & BIRADS 1. Denies breast or urinary complaints. Denies pelvic pain, abnormal bleeding or discharge. Pt reports no STIs in the past and no concerns. Contraception consist of Nexplanon, due for removal in 2023 per pt. Denies tobacco use. Dep. screening 0. Denies fly hx of breast, uterine or colon cancer. Mother with hx of ovarian cancer.    Pap Hx:  2017: ASCUS hrHPV+  2017: scant endocervical cells, squamous metaplasia  2019: NILM, hrHPV+  2019 ECC: squamous metaplaisa and squamous dysplasia; high grade lesion cannot be ruled out  2020: colpo 6:00- superficial squamous ectocervical epithelium; ECC immature squamous epithelium  2021 pap: NILM hrHPV neg  2021 endocervical mass bx: chronic cervicitis and squamous metaplasia  2022 ASCUS, HRHPV POSITIVE     GYN & OB History  No LMP recorded. Patient has had an implant.   Date of Last Pap: 2022    Review of patient's allergies indicates:  No Known Allergies  Past Medical History:   Diagnosis Date    Abnormal Pap smear of cervix     Anemia     Hypertension      OB History    Para Term  AB Living   3 3 3     3   SAB IAB Ectopic Multiple Live Births           3      # Outcome Date GA Lbr Nathaniel/2nd Weight Sex Delivery Anes PTL Lv   3 Term     M Vag-Spont  N ANDREZ   2 Term     F Vag-Spont  N ANDREZ   1 Term     M Vag-Spont  N ANDREZ        Review of Systems  Review of Systems    Negative except for pertinent findings for positives per HPI     Objective:    Physical Exam    /87 (BP Location: Left  "arm, Patient Position: Sitting, BP Method: Medium (Automatic))   Pulse 81   Temp 99 °F (37.2 °C) (Oral)   Resp 20   Ht 5' 5" (1.651 m)   Wt 86.3 kg (190 lb 3.2 oz)   SpO2 100%   BMI 31.65 kg/m²   GENERAL: Well-developed female. No acute distress.    SKIN: Normal to inspection, warm and intact.  BREASTS: No rashes or erythema. No masses, lumps, discharge, tenderness.  ABDOMEN: Soft, non tender.  VULVA: General appearance normal; external genitalia with no lesions or erythema.  BLADDER: No tenderness.  VAGINA: Mucosa/vaginal vault pink, no abnormal discharge or lesions.  CERVIX: Pink, parous appearing os, no erythema or abnormal discharge.  BIMANUAL EXAM: reveals a 10 week-sized uterus. The uterus is non tender. Roly adnexa reveal no tenderness.  PSYCHIATRIC: Patient is oriented to person, place, and time. Mood and affect are normal.    Assessment:       1. Pap smear for cervical cancer screening    2. Visit for screening mammogram    3. Colon cancer screening    4. Surveillance of contraceptive implant    5. Encounter to establish care       Plan:   Lavern was seen today for gynecologic exam.    Diagnoses and all orders for this visit:    Pap smear for cervical cancer screening  -     Liquid-Based Pap Smear, Screening Screening    Visit for screening mammogram  -     Mammo Digital Screening Bilat w/ Nilesh; Future    Colon cancer screening  -     Cologuard Screening (Multitarget Stool DNA); Future  -     Cologuard Screening (Multitarget Stool DNA)    Surveillance of contraceptive implant    Encounter to establish care  -     Ambulatory referral/consult to Family Practice; Future    Pap today  MG ordered  Cologuard  Nexplanon to Lt UE. Per pt due out 12/2020. Desires removal and insertion. Discussed likely perimenopause, desires contraception. Not interested in other options. Will check for PA.  Referral for PCP  Follow up in about 1 year (around 10/16/2024) for annual exam.    "

## 2023-10-16 NOTE — LETTER
October 16, 2023      [facility]  [address] [address]  Phone: [phone]       Patient: Lavern Banegas   YOB: 1975  Date of Visit: 10/16/2023    To Whom It May Concern:    Alea Banegas  was at [facility] on 10/16/2023. The patient may return to work/school on 10/16/2023 with no restrictions. If you have any questions or concerns, or if I can be of further assistance, please do not hesitate to contact me.    Sincerely,    Nemesio Souza NP

## 2023-10-20 LAB — PSYCHE PATHOLOGY RESULT: NORMAL

## 2023-10-27 ENCOUNTER — HOSPITAL ENCOUNTER (OUTPATIENT)
Dept: RADIOLOGY | Facility: HOSPITAL | Age: 48
Discharge: HOME | End: 2023-10-27
Attending: NURSE PRACTITIONER
Payer: COMMERCIAL

## 2023-10-27 DIAGNOSIS — Z12.31 VISIT FOR SCREENING MAMMOGRAM: ICD-10-CM

## 2023-10-27 PROCEDURE — 77067 SCR MAMMO BI INCL CAD: CPT | Mod: TC

## 2023-10-27 PROCEDURE — 77063 BREAST TOMOSYNTHESIS BI: CPT | Mod: 26,,, | Performed by: RADIOLOGY

## 2023-10-27 PROCEDURE — 77063 MAMMO DIGITAL SCREENING BILAT WITH TOMO: ICD-10-PCS | Mod: 26,,, | Performed by: RADIOLOGY

## 2023-10-27 PROCEDURE — 77067 SCR MAMMO BI INCL CAD: CPT | Mod: 26,,, | Performed by: RADIOLOGY

## 2023-10-27 PROCEDURE — 77067 MAMMO DIGITAL SCREENING BILAT WITH TOMO: ICD-10-PCS | Mod: 26,,, | Performed by: RADIOLOGY

## 2023-11-15 LAB — NONINV COLON CA DNA+OCC BLD SCRN STL QL: NEGATIVE

## 2023-11-20 ENCOUNTER — OFFICE VISIT (OUTPATIENT)
Dept: GYNECOLOGY | Facility: CLINIC | Age: 48
End: 2023-11-20
Payer: COMMERCIAL

## 2023-11-20 VITALS
RESPIRATION RATE: 18 BRPM | HEIGHT: 65 IN | DIASTOLIC BLOOD PRESSURE: 98 MMHG | HEART RATE: 86 BPM | BODY MASS INDEX: 31.72 KG/M2 | WEIGHT: 190.38 LBS | SYSTOLIC BLOOD PRESSURE: 149 MMHG | TEMPERATURE: 98 F | OXYGEN SATURATION: 100 %

## 2023-11-20 DIAGNOSIS — R03.0 ELEVATED BLOOD PRESSURE READING: ICD-10-CM

## 2023-11-20 DIAGNOSIS — R87.615 ENCOUNTER FOR REPEAT PAPANICOLAOU SMEAR OF CERVIX DUE TO PREVIOUS UNSATISFACTORY RESULTS: Primary | ICD-10-CM

## 2023-11-20 PROCEDURE — 87624 HPV HI-RISK TYP POOLED RSLT: CPT

## 2023-11-20 PROCEDURE — 99213 OFFICE O/P EST LOW 20 MIN: CPT | Mod: PBBFAC | Performed by: NURSE PRACTITIONER

## 2023-11-20 PROCEDURE — 88174 CYTOPATH C/V AUTO IN FLUID: CPT | Performed by: NURSE PRACTITIONER

## 2023-11-20 PROCEDURE — 87625 HPV TYPES 16 & 18 ONLY: CPT

## 2023-11-20 PROCEDURE — 99213 OFFICE O/P EST LOW 20 MIN: CPT | Mod: S$PBB,,, | Performed by: NURSE PRACTITIONER

## 2023-11-20 PROCEDURE — 99213 PR OFFICE/OUTPT VISIT, EST, LEVL III, 20-29 MIN: ICD-10-PCS | Mod: S$PBB,,, | Performed by: NURSE PRACTITIONER

## 2023-11-20 NOTE — PROGRESS NOTES
"  Subjective:       Patient ID: Lavern Banegas is a 48 y.o. female.    Chief Complaint:  repeat pap      History of Present Illness  The patient  here for repeat pap smear d/t invalid HPV results. History of abnormal paps (see bleow). Pt had colpo with endocervical polyp removal in 2022 with GYN. Previous endocervical bx in .      Pap Hx:  2017 ASCUS hrHPV+  2017 scant endocervical cells, squamous metaplasia  2019 NILM, hrHPV+  2019 ECC: squamous metaplaisa and squamous dysplasia; high grade lesion cannot be ruled out  2020 Colpo 6:00- superficial squamous ectocervical epithelium; ECC immature squamous epithelium  2021 pap: NILM HRHPV neg  2021 endocervical mass bx: chronic cervicitis and squamous metaplasia  2022 ASCUS, HRHPV POSITIVE   2022 Colpo and Endocervical polypectomy-ECC benign  10/2023  ASCUS HRHPV positive, HPV 16/18/45 invalid    GYN & OB History  No LMP recorded. Patient has had an implant.   Date of Last Pap: 10/20/2023    Review of patient's allergies indicates:  No Known Allergies  Past Medical History:   Diagnosis Date    Abnormal Pap smear of cervix     Anemia     Hypertension      OB History    Para Term  AB Living   3 3 3     3   SAB IAB Ectopic Multiple Live Births           3      # Outcome Date GA Lbr Nathaniel/2nd Weight Sex Delivery Anes PTL Lv   3 Term     M Vag-Spont  N ANDREZ   2 Term     F Vag-Spont  N ANDREZ   1 Term     M Vag-Spont  N ANDREZ        Review of Systems  Review of Systems    Negative except for pertinent findings for positives per HPI     Objective:    Physical Exam    BP (!) 149/98 (BP Location: Left arm, Patient Position: Sitting, BP Method: Medium (Automatic))   Pulse 86   Temp 98.4 °F (36.9 °C) (Oral)   Resp 18   Ht 5' 5" (1.651 m)   Wt 86.4 kg (190 lb 6.4 oz)   SpO2 100%   BMI 31.68 kg/m²   GENERAL: Well-developed female. No acute distress.    SKIN: Normal to inspection, warm and intact.  VULVA: General appearance " normal; external genitalia with no lesions or erythema.  VAGINA: Mucosa/vaginal vault pink, no abnormal discharge or lesions.  CERVIX: Pink, parous appearing os, friable, no erythema or abnormal discharge.  PSYCHIATRIC: Patient is oriented to person, place, and time. Mood and affect are normal.    Assessment:       1. Encounter for repeat Papanicolaou smear of cervix due to previous unsatisfactory results    2. Elevated blood pressure reading       Plan:   Lavern was seen today for repeat pap.    Diagnoses and all orders for this visit:    Encounter for repeat Papanicolaou smear of cervix due to previous unsatisfactory results  -     Liquid-Based Pap Smear, Screening Screening    Elevated blood pressure reading    Repeat pap today  /98, took BP medication today, but admits to weaning off meds as readings have been normal at home. Pt instructed to continue BP medication daily as prescribed and f/u with PCP.  Follow up for annual exam.

## 2023-11-24 LAB — PSYCHE PATHOLOGY RESULT: NORMAL

## 2023-11-27 ENCOUNTER — TELEPHONE (OUTPATIENT)
Dept: GYNECOLOGY | Facility: CLINIC | Age: 48
End: 2023-11-27
Payer: COMMERCIAL

## 2023-11-27 NOTE — TELEPHONE ENCOUNTER
Dr. Martinez    Please review this patient's history below:    Pap Hx:  6/2017 ASCUS hrHPV+  8/2017 scant endocervical cells, squamous metaplasia  4/2019 NILM, hrHPV+  8/2019 ECC: squamous metaplaisa and squamous dysplasia; high grade lesion cannot be ruled out  6/2020 Colpo 6:00- superficial squamous ectocervical epithelium; ECC immature squamous epithelium  6/2021 pap: NILM HRHPV neg  6/2021 endocervical mass bx: chronic cervicitis and squamous metaplasia  6/2022 ASCUS, HRHPV POSITIVE   8/2022 Colpo and Endocervical polypectomy-ECC benign  10/2023  ASCUS HRHPV positive, HPV 16/18/45 invalid     Pap repeated last week d/t invalid HPV  11/2023 LSIL and HPV positive OHR and 18/45    Discussed need for repeat colpo based on results and pt states she does not want to have another colpo d/t the pain associated with procedure. She is asking for other options and has also mentioned wanting a hysterectomy if pap continues to be abnormal. Please see telephone message in chart from 7/19/22.

## 2024-01-23 ENCOUNTER — OFFICE VISIT (OUTPATIENT)
Dept: FAMILY MEDICINE | Facility: CLINIC | Age: 49
End: 2024-01-23
Payer: COMMERCIAL

## 2024-01-23 VITALS
WEIGHT: 193 LBS | DIASTOLIC BLOOD PRESSURE: 85 MMHG | HEART RATE: 76 BPM | SYSTOLIC BLOOD PRESSURE: 133 MMHG | TEMPERATURE: 99 F | HEIGHT: 65 IN | RESPIRATION RATE: 18 BRPM | OXYGEN SATURATION: 100 % | BODY MASS INDEX: 32.15 KG/M2

## 2024-01-23 DIAGNOSIS — Z00.00 ANNUAL PHYSICAL EXAM: Primary | ICD-10-CM

## 2024-01-23 DIAGNOSIS — E66.09 CLASS 1 OBESITY DUE TO EXCESS CALORIES WITH SERIOUS COMORBIDITY AND BODY MASS INDEX (BMI) OF 32.0 TO 32.9 IN ADULT: ICD-10-CM

## 2024-01-23 DIAGNOSIS — Z76.89 ENCOUNTER TO ESTABLISH CARE: ICD-10-CM

## 2024-01-23 DIAGNOSIS — I10 PRIMARY HYPERTENSION: ICD-10-CM

## 2024-01-23 PROBLEM — E66.811 CLASS 1 OBESITY DUE TO EXCESS CALORIES WITH SERIOUS COMORBIDITY AND BODY MASS INDEX (BMI) OF 32.0 TO 32.9 IN ADULT: Status: ACTIVE | Noted: 2024-01-23

## 2024-01-23 LAB
ALBUMIN SERPL-MCNC: 4.2 G/DL (ref 3.5–5)
ALBUMIN/GLOB SERPL: 1 RATIO (ref 1.1–2)
ALP SERPL-CCNC: 96 UNIT/L (ref 40–150)
ALT SERPL-CCNC: 17 UNIT/L (ref 0–55)
APPEARANCE UR: CLEAR
AST SERPL-CCNC: 14 UNIT/L (ref 5–34)
BACTERIA #/AREA URNS AUTO: ABNORMAL /HPF
BASOPHILS # BLD AUTO: 0.02 X10(3)/MCL
BASOPHILS NFR BLD AUTO: 0.3 %
BILIRUB SERPL-MCNC: 0.4 MG/DL
BILIRUB UR QL STRIP.AUTO: NEGATIVE
BUN SERPL-MCNC: 9 MG/DL (ref 9.8–20.1)
CALCIUM SERPL-MCNC: 9.6 MG/DL (ref 8.4–10.2)
CHLORIDE SERPL-SCNC: 105 MMOL/L (ref 98–107)
CHOLEST SERPL-MCNC: 202 MG/DL
CHOLEST/HDLC SERPL: 3 {RATIO} (ref 0–5)
CO2 SERPL-SCNC: 26 MMOL/L (ref 22–29)
COLOR UR AUTO: YELLOW
CREAT SERPL-MCNC: 0.67 MG/DL (ref 0.55–1.02)
EOSINOPHIL # BLD AUTO: 0.13 X10(3)/MCL (ref 0–0.9)
EOSINOPHIL NFR BLD AUTO: 2.3 %
ERYTHROCYTE [DISTWIDTH] IN BLOOD BY AUTOMATED COUNT: 13.6 % (ref 11.5–17)
EST. AVERAGE GLUCOSE BLD GHB EST-MCNC: 114 MG/DL
GFR SERPLBLD CREATININE-BSD FMLA CKD-EPI: >60 MLS/MIN/1.73/M2
GLOBULIN SER-MCNC: 4.2 GM/DL (ref 2.4–3.5)
GLUCOSE SERPL-MCNC: 63 MG/DL (ref 74–100)
GLUCOSE UR QL STRIP.AUTO: NORMAL
HBA1C MFR BLD: 5.6 %
HCT VFR BLD AUTO: 42.8 % (ref 37–47)
HCV AB SERPL QL IA: NONREACTIVE
HDLC SERPL-MCNC: 73 MG/DL (ref 35–60)
HGB BLD-MCNC: 13.5 G/DL (ref 12–16)
HIV 1+2 AB+HIV1 P24 AG SERPL QL IA: NONREACTIVE
HYALINE CASTS #/AREA URNS LPF: ABNORMAL /LPF
IMM GRANULOCYTES # BLD AUTO: 0.01 X10(3)/MCL (ref 0–0.04)
IMM GRANULOCYTES NFR BLD AUTO: 0.2 %
KETONES UR QL STRIP.AUTO: NEGATIVE
LDLC SERPL CALC-MCNC: 116 MG/DL (ref 50–140)
LEUKOCYTE ESTERASE UR QL STRIP.AUTO: NEGATIVE
LYMPHOCYTES # BLD AUTO: 2.03 X10(3)/MCL (ref 0.6–4.6)
LYMPHOCYTES NFR BLD AUTO: 35.2 %
MCH RBC QN AUTO: 28.7 PG (ref 27–31)
MCHC RBC AUTO-ENTMCNC: 31.5 G/DL (ref 33–36)
MCV RBC AUTO: 90.9 FL (ref 80–94)
MONOCYTES # BLD AUTO: 0.36 X10(3)/MCL (ref 0.1–1.3)
MONOCYTES NFR BLD AUTO: 6.3 %
MUCOUS THREADS URNS QL MICRO: ABNORMAL /LPF
NEUTROPHILS # BLD AUTO: 3.21 X10(3)/MCL (ref 2.1–9.2)
NEUTROPHILS NFR BLD AUTO: 55.7 %
NITRITE UR QL STRIP.AUTO: NEGATIVE
NRBC BLD AUTO-RTO: 0 %
PH UR STRIP.AUTO: 5.5 [PH]
PLATELET # BLD AUTO: 335 X10(3)/MCL (ref 130–400)
PMV BLD AUTO: 10.6 FL (ref 7.4–10.4)
POTASSIUM SERPL-SCNC: 4 MMOL/L (ref 3.5–5.1)
PROT SERPL-MCNC: 8.4 GM/DL (ref 6.4–8.3)
PROT UR QL STRIP.AUTO: ABNORMAL
RBC # BLD AUTO: 4.71 X10(6)/MCL (ref 4.2–5.4)
RBC #/AREA URNS AUTO: ABNORMAL /HPF
RBC UR QL AUTO: ABNORMAL
SODIUM SERPL-SCNC: 141 MMOL/L (ref 136–145)
SP GR UR STRIP.AUTO: 1.03 (ref 1–1.03)
SQUAMOUS #/AREA URNS LPF: ABNORMAL /HPF
TRIGL SERPL-MCNC: 66 MG/DL (ref 37–140)
TSH SERPL-ACNC: 1.5 UIU/ML (ref 0.35–4.94)
UROBILINOGEN UR STRIP-ACNC: NORMAL
VLDLC SERPL CALC-MCNC: 13 MG/DL
WBC # SPEC AUTO: 5.76 X10(3)/MCL (ref 4.5–11.5)
WBC #/AREA URNS AUTO: ABNORMAL /HPF

## 2024-01-23 PROCEDURE — 80053 COMPREHEN METABOLIC PANEL: CPT | Performed by: NURSE PRACTITIONER

## 2024-01-23 PROCEDURE — 85025 COMPLETE CBC W/AUTO DIFF WBC: CPT | Performed by: NURSE PRACTITIONER

## 2024-01-23 PROCEDURE — 83036 HEMOGLOBIN GLYCOSYLATED A1C: CPT | Performed by: NURSE PRACTITIONER

## 2024-01-23 PROCEDURE — 86803 HEPATITIS C AB TEST: CPT | Performed by: NURSE PRACTITIONER

## 2024-01-23 PROCEDURE — 99215 OFFICE O/P EST HI 40 MIN: CPT | Mod: PBBFAC | Performed by: NURSE PRACTITIONER

## 2024-01-23 PROCEDURE — 99396 PREV VISIT EST AGE 40-64: CPT | Mod: S$PBB,,, | Performed by: NURSE PRACTITIONER

## 2024-01-23 PROCEDURE — 80061 LIPID PANEL: CPT | Performed by: NURSE PRACTITIONER

## 2024-01-23 PROCEDURE — 84443 ASSAY THYROID STIM HORMONE: CPT | Performed by: NURSE PRACTITIONER

## 2024-01-23 PROCEDURE — 81001 URINALYSIS AUTO W/SCOPE: CPT | Performed by: NURSE PRACTITIONER

## 2024-01-23 PROCEDURE — 87389 HIV-1 AG W/HIV-1&-2 AB AG IA: CPT | Performed by: NURSE PRACTITIONER

## 2024-01-23 PROCEDURE — 36415 COLL VENOUS BLD VENIPUNCTURE: CPT | Performed by: NURSE PRACTITIONER

## 2024-01-23 RX ORDER — AMLODIPINE BESYLATE 10 MG/1
10 TABLET ORAL DAILY
Qty: 90 TABLET | Refills: 1 | Status: SHIPPED | OUTPATIENT
Start: 2024-01-23 | End: 2024-04-24 | Stop reason: SDUPTHER

## 2024-01-23 NOTE — ASSESSMENT & PLAN NOTE
Alcohol:  Drink in moderation.  Exercise: exercise 30 minutes a day up to 5 days a week.  Stay active.  Lose weight.  Nutrition:  Follow low-cholesterol, low-fat, low-salt diet.  Eat fresh fruits and vegetables.  Keep in mind portion size.  Keep fiber intake at 30 g per day if possible  Read discharge education material.

## 2024-01-23 NOTE — PATIENT INSTRUCTIONS
Ajith Puckett,     If you are due for any health screening(s) below please notify me so we can arrange them to be ordered and scheduled. Most healthy patients at your age complete them, but you are free to accept or refuse.     If you can't do it, I'll definitely understand. If you can, I'd certainly appreciate it!    Tests to Keep You Healthy    Mammogram: Met on 10/27/2023  Colon Cancer Screening: Met on 11/7/2023  Cervical Cancer Screening: Met on 11/20/2023  Last Blood Pressure <= 139/89 (1/23/2024): NO

## 2024-01-23 NOTE — ASSESSMENT & PLAN NOTE
Controlled, blood pressure 133/85, apical pulse 74.  Patient currently takes Norvasc 10 mg p.o. once daily.  Patient state she is compliant with medication, benefitting from dose and denying any side effects.  Discussed weight loss, follow low-salt intake, high-fiber intake, patient to read discharge education materials.  Patient to return in 3 months for follow-up.  Return sooner if needed.

## 2024-01-23 NOTE — PROGRESS NOTES
Patient Name: Lavern Banegas   : 1975  MRN: [id number]     SUBJECTIVE DATA:    CHIEF COMPLAINT:   Lavern Banegas is a 48 y.o. female who presents to clinic today with Establish Care        HPI:  48-year-old female presents to the clinic to establish care, manage hypertension as well agreed to complete her yearly wellness physical with fasting lab work.    Hypertension:  Controlled, blood pressure 133/85, apical pulse 74.  Patient currently takes Norvasc 10 mg p.o. once daily.  Patient state she is compliant with medication, benefitting from dose and denying any side effects.  Discussed weight loss, follow low-salt intake, high-fiber intake, patient to read discharge education materials.  Patient to return in 3 months for follow-up.  Return sooner if needed.      Social Determinants of Health     Tobacco Use: Low Risk  (2024)    Patient History     Smoking Tobacco Use: Never     Smokeless Tobacco Use: Never     Passive Exposure: Not on file   Alcohol Use: Not At Risk (2022)    AUDIT-C     Frequency of Alcohol Consumption: Monthly or less     Average Number of Drinks: 1 or 2     Frequency of Binge Drinking: Never   Financial Resource Strain: Low Risk  (2022)    Overall Financial Resource Strain (CARDIA)     Difficulty of Paying Living Expenses: Not very hard   Food Insecurity: Food Insecurity Present (2022)    Hunger Vital Sign     Worried About Running Out of Food in the Last Year: Sometimes true     Ran Out of Food in the Last Year: Sometimes true   Transportation Needs: No Transportation Needs (2022)    PRAPARE - Transportation     Lack of Transportation (Medical): No     Lack of Transportation (Non-Medical): No   Physical Activity: Sufficiently Active (2022)    Exercise Vital Sign     Days of Exercise per Week: 3 days     Minutes of Exercise per Session: 60 min   Recent Concern: Physical Activity - Insufficiently Active (2022)    Exercise Vital Sign     Days of  Exercise per Week: 3 days     Minutes of Exercise per Session: 30 min   Stress: Stress Concern Present (6/22/2022)    [place] [employer] - Occupational Stress Questionnaire     Feeling of Stress : To some extent   Social Connections: Moderately Integrated (6/22/2022)    Social Connection and Isolation Panel [NHANES]     Frequency of Communication with Friends and Family: Three times a week     Frequency of Social Gatherings with Friends and Family: Three times a week     Attends Sikh Services: More than 4 times per year     Active Member of Clubs or Organizations: No     Attends Club or Organization Meetings: Never     Marital Status:    Housing Stability: Low Risk  (6/22/2022)    Housing Stability Vital Sign     Unable to Pay for Housing in the Last Year: No     Number of Places Lived in the Last Year: 1     Unstable Housing in the Last Year: No   Depression: Low Risk  (1/23/2024)    Depression     Last PHQ-4: Flowsheet Data: 0     Last menstrual cycle:  Patient has implanted Implanon to left arm.  Patient state has not had a menstrual cycle for about a year.  Car safety:  Seat belt used all the time.  Water:  Stay hydrated with fluids, drink 6-8 cups of water daily.  Alcohol:  Drink in moderation.  Exercise: exercise 30 minutes a day up to 5 days a week.  Stay active.  Lose weight.  Nutrition:  Follow low-cholesterol, low-fat, low-salt diet.  Eat fresh fruits and vegetables.  Keep in mind portion size.  Keep fiber intake at 30 g per day if possible  Dental:  Patient does follow-up with a dentist yearly.  Ophthalmology:  Patient does not follow-up with ophthalmologist yearly.  Advise patient to schedule routine eye exam with Ophthalmology.  Cervical cancer screening exam:  Up-to-date.  Patient has colposcopy schedule in February 2024.  Mammogram:  Up-to-date.  Next appointment on October 27, 2024.IMPRESSION: NEGATIVE  Right Breast: Negative (BI-RADS 1)  Left Breast: Negative  "(BI-RADS 1)  Colonoscopy:  Up-to-date.  Cologuard negative. Next due on November 7, 2026.   Immunizations:  Declines.  Discussed with patient she can return to clinic for completion or she can go to any pharmacy.  Patient verbalized.    Fasting blood work drawn today will notify of test results when they become available.     Patient denies chest pain, shortness of breath, dyspnea on exertion, palpitations, peripheral edema, abdominal pain, nausea, vomiting, diarrhea, constipation, fatigue, fever, chills, dysuria,  hematuria, melena, or hematochezia.        ALLERGIES: Review of patient's allergies indicates:  No Known Allergies      ROS:  Review of Systems   All other systems reviewed and are negative.        OBJECTIVE DATA:  Vital signs  Vitals:    01/23/24 0855   BP: 133/85   Pulse: 76   Resp: 18   Temp: 98.6 °F (37 °C)   TempSrc: Oral   SpO2: 100%   Weight: 87.5 kg (193 lb)   Height: 5' 5" (1.651 m)      Body mass index is 32.12 kg/m².    PHYSICAL EXAM:   Physical Exam  Vitals and nursing note reviewed.   Constitutional:       General: She is awake. She is not in acute distress.     Appearance: Normal appearance. She is well-developed and well-groomed. She is obese. She is not ill-appearing, toxic-appearing or diaphoretic.   HENT:      Head: Normocephalic and atraumatic.      Right Ear: Tympanic membrane, ear canal and external ear normal.      Left Ear: Tympanic membrane, ear canal and external ear normal.      Nose: Nose normal.      Mouth/Throat:      Lips: Pink.      Mouth: Mucous membranes are moist.      Tongue: No lesions. Tongue does not deviate from midline.      Palate: No mass and lesions.      Pharynx: Oropharynx is clear.   Eyes:      General: Lids are normal. Gaze aligned appropriately. No scleral icterus.     Extraocular Movements: Extraocular movements intact.      Conjunctiva/sclera: Conjunctivae normal.      Pupils: Pupils are equal, round, and reactive to light.      Visual Fields: Right eye " visual fields normal and left eye visual fields normal.   Neck:      Thyroid: No thyroid mass, thyromegaly or thyroid tenderness.      Trachea: Trachea and phonation normal.   Cardiovascular:      Rate and Rhythm: Normal rate and regular rhythm.      Pulses: Normal pulses.           Carotid pulses are 2+ on the right side and 2+ on the left side.       Radial pulses are 2+ on the right side and 2+ on the left side.        Femoral pulses are 2+ on the right side and 2+ on the left side.       Dorsalis pedis pulses are 2+ on the right side and 2+ on the left side.        Posterior tibial pulses are 2+ on the right side and 2+ on the left side.      Heart sounds: Normal heart sounds, S1 normal and S2 normal. No murmur heard.  Pulmonary:      Effort: Pulmonary effort is normal.      Breath sounds: Normal breath sounds and air entry. No wheezing or rhonchi.   Abdominal:      General: Bowel sounds are normal. There is no distension.      Palpations: Abdomen is soft. There is no mass.      Tenderness: There is no abdominal tenderness. There is no right CVA tenderness, left CVA tenderness, guarding or rebound.   Genitourinary:     Comments: Denies any urinary or bowel habit changes.  Continue follow-up with gyn.  Musculoskeletal:         General: Normal range of motion.      Cervical back: Normal range of motion and neck supple. No rigidity or tenderness.      Right lower leg: No edema.      Left lower leg: No edema.   Lymphadenopathy:      Cervical: No cervical adenopathy.   Skin:     General: Skin is warm and dry.      Capillary Refill: Capillary refill takes less than 2 seconds.      Findings: No rash.   Neurological:      General: No focal deficit present.      Mental Status: She is alert and oriented to person, place, and time. Mental status is at baseline.      GCS: GCS eye subscore is 4. GCS verbal subscore is 5. GCS motor subscore is 6.      Cranial Nerves: Cranial nerves 2-12 are intact. No cranial nerve deficit.       Sensory: Sensation is intact. No sensory deficit.      Motor: Motor function is intact. No weakness.      Coordination: Coordination is intact. Coordination normal.      Gait: Gait is intact. Gait normal.   Psychiatric:         Attention and Perception: Attention and perception normal.         Mood and Affect: Mood and affect normal.         Speech: Speech normal.         Behavior: Behavior normal. Behavior is cooperative.         Thought Content: Thought content normal.         Cognition and Memory: Cognition and memory normal.         Judgment: Judgment normal.          ASSESSMENT/PLAN:  1. Annual physical exam  -     CBC Auto Differential  -     Comprehensive Metabolic Panel  -     TSH  -     Lipid Panel  -     Hemoglobin A1C  -     Hepatitis C Antibody  -     HIV 1/2 Ag/Ab (4th Gen)  -     Urinalysis, Reflex to Urine Culture    2. Primary hypertension  Assessment & Plan:   Controlled, blood pressure 133/85, apical pulse 74.  Patient currently takes Norvasc 10 mg p.o. once daily.  Patient state she is compliant with medication, benefitting from dose and denying any side effects.  Discussed weight loss, follow low-salt intake, high-fiber intake, patient to read discharge education materials.  Patient to return in 3 months for follow-up.  Return sooner if needed.      Orders:  -     amLODIPine (NORVASC) 10 MG tablet; Take 1 tablet (10 mg total) by mouth once daily.  Dispense: 90 tablet; Refill: 1    3. Class 1 obesity due to excess calories with serious comorbidity and body mass index (BMI) of 32.0 to 32.9 in adult  Assessment & Plan:  Alcohol:  Drink in moderation.  Exercise: exercise 30 minutes a day up to 5 days a week.  Stay active.  Lose weight.  Nutrition:  Follow low-cholesterol, low-fat, low-salt diet.  Eat fresh fruits and vegetables.  Keep in mind portion size.  Keep fiber intake at 30 g per day if possible  Read discharge education material.    Orders:  -     Hemoglobin A1C    4. Encounter to  [facility] care  -     Ambulatory referral/consult to Family Practice           RESULTS:  Recent Results (from the past 1008 hour(s))   Comprehensive Metabolic Panel    Collection Time: 01/23/24  9:46 AM   Result Value Ref Range    Sodium Level 141 136 - 145 mmol/L    Potassium Level 4.0 3.5 - 5.1 mmol/L    Chloride 105 98 - 107 mmol/L    Carbon Dioxide 26 22 - 29 mmol/L    Glucose Level 63 (L) 74 - 100 mg/dL    Blood Urea Nitrogen 9.0 (L) 9.8 - 20.1 mg/dL    Creatinine 0.67 0.55 - 1.02 mg/dL    Calcium Level Total 9.6 8.4 - 10.2 mg/dL    Protein Total 8.4 (H) 6.4 - 8.3 gm/dL    Albumin Level 4.2 3.5 - 5.0 g/dL    Globulin 4.2 (H) 2.4 - 3.5 gm/dL    Albumin/Globulin Ratio 1.0 (L) 1.1 - 2.0 ratio    Bilirubin Total 0.4 <=1.5 mg/dL    Alkaline Phosphatase 96 40 - 150 unit/L    Alanine Aminotransferase 17 0 - 55 unit/L    Aspartate Aminotransferase 14 5 - 34 unit/L    eGFR >60 mls/min/1.73/m2   TSH    Collection Time: 01/23/24  9:46 AM   Result Value Ref Range    TSH 1.500 0.350 - 4.940 uIU/mL   Lipid Panel    Collection Time: 01/23/24  9:46 AM   Result Value Ref Range    Cholesterol Total 202 (H) <=200 mg/dL    HDL Cholesterol 73 (H) 35 - 60 mg/dL    Triglyceride 66 37 - 140 mg/dL    Cholesterol/HDL Ratio 3 0 - 5    Very Low Density Lipoprotein 13     LDL Cholesterol 116.00 50.00 - 140.00 mg/dL   Hemoglobin A1C    Collection Time: 01/23/24  9:46 AM   Result Value Ref Range    Hemoglobin A1c 5.6 <=7.0 %    Estimated Average Glucose 114.0 mg/dL   Hepatitis C Antibody    Collection Time: 01/23/24  9:46 AM   Result Value Ref Range    Hep C Ab Interp Nonreactive Nonreactive   HIV 1/2 Ag/Ab (4th Gen)    Collection Time: 01/23/24  9:46 AM   Result Value Ref Range    HIV Nonreactive Nonreactive   Urinalysis, Reflex to Urine Culture    Collection Time: 01/23/24  9:46 AM    Specimen: Urine   Result Value Ref Range    Color, UA Yellow Yellow, Light-Yellow, Dark Yellow, [place], Straw    Appearance, UA Clear Clear    Specific  Gravity, UA 1.026 1.005 - 1.030    pH, UA 5.5 5.0 - 8.5    Protein, UA Trace (A) Negative    Glucose, UA Normal Negative, Normal    Ketones, UA Negative Negative    Blood, UA Trace (A) Negative    Bilirubin, UA Negative Negative    Urobilinogen, UA Normal 0.2, 1.0, Normal    Nitrites, UA Negative Negative    Leukocyte Esterase, UA Negative Negative    WBC, UA 0-5 None Seen, 0-2, 3-5, 0-5 /HPF    Bacteria, UA Trace (A) None Seen /HPF    Squamous Epithelial Cells, UA Many (A) None Seen /HPF    Mucous, UA Occ (A) None Seen /LPF    Hyaline Casts, UA None Seen None Seen /lpf    RBC, UA 0-5 None Seen, 0-2, 3-5, 0-5 /HPF   CBC with Differential    Collection Time: 01/23/24  9:46 AM   Result Value Ref Range    WBC 5.76 4.50 - 11.50 x10(3)/mcL    RBC 4.71 4.20 - 5.40 x10(6)/mcL    Hgb 13.5 12.0 - 16.0 g/dL    Hct 42.8 37.0 - 47.0 %    MCV 90.9 80.0 - 94.0 fL    MCH 28.7 27.0 - 31.0 pg    MCHC 31.5 (L) 33.0 - 36.0 g/dL    RDW 13.6 11.5 - 17.0 %    Platelet 335 130 - 400 x10(3)/mcL    MPV 10.6 (H) 7.4 - 10.4 fL    Neut % 55.7 %    Lymph % 35.2 %    Mono % 6.3 %    Eos % 2.3 %    Basophil % 0.3 %    Lymph # 2.03 0.6 - 4.6 x10(3)/mcL    Neut # 3.21 2.1 - 9.2 x10(3)/mcL    Mono # 0.36 0.1 - 1.3 x10(3)/mcL    Eos # 0.13 0 - 0.9 x10(3)/mcL    Baso # 0.02 <=0.2 x10(3)/mcL    IG# 0.01 0 - 0.04 x10(3)/mcL    IG% 0.2 %    NRBC% 0.0 %         Follow Up:  Follow up in about 3 months (around 4/17/2024).      Previous medical history/lab work/radiology reviewed and considered during medical management decisions.   Medication list reviewed and medication reconciliation performed.  Patient was provided  and care about his/her current diagnosis (es) and medications including risk/benefit and side effects/adverse events, over the counter medication uses/doses, home self-care and contact precautions,  and red flags and indications for when to seek immediate medical attention.   Patient was advised to continue compliance with current  medication list and medical recommendations.  Patient dvised continued compliance with recommended eating habits/ diets for medical conditions and exercise 150 minutes/ week (if possible) for medical condition (s).  Educational handouts and instructions on selected disease management in AVS (After Visit Summary).    All of the patient's questions were answered to patient's satisfaction.   The patient was receptive, expressed verbal understanding and agreement the above plan.      This note was created with the assistance of a voice recognition software or phone dictation. There may be transcription errors as a result of using this technology however minimal. Effort has been made to assure accuracy of transcription but any obvious errors or omissions should be clarified with the author of the document

## 2024-01-24 ENCOUNTER — TELEPHONE (OUTPATIENT)
Dept: FAMILY MEDICINE | Facility: CLINIC | Age: 49
End: 2024-01-24
Payer: COMMERCIAL

## 2024-01-24 NOTE — TELEPHONE ENCOUNTER
----- Message from HALIE Hunt sent at 1/24/2024  8:41 AM CST -----  PLEASE CALL PATIENTS WITH RESULTS,   Chemistry, liver, kidney, electrolytes within acceptable range.  Cholesterol within acceptable range no treatment needed.  Urinalysis no sign of infection.  Traces of blood and protein.  Will repeat on next visit .  Blood count no sign of anemia or infection.  Thyroid function test normal.  No Treatment needed.  HIV and hep C no sign of infection.  Hemoglobin A1c 5.6% no sign of prediabetes or diabetes.  Keep up the good work.  Keep your follow-up appointment.  Return to clinic sooner if needed.

## 2024-01-24 NOTE — PROGRESS NOTES
PLEASE CALL PATIENTS WITH RESULTS,   Chemistry, liver, kidney, electrolytes within acceptable range.  Cholesterol within acceptable range no treatment needed.  Urinalysis no sign of infection.  Traces of blood and protein.  Will repeat on next visit .  Blood count no sign of anemia or infection.  Thyroid function test normal.  No Treatment needed.  HIV and hep C no sign of infection.  Hemoglobin A1c 5.6% no sign of prediabetes or diabetes.  Keep up the good work.  Keep your follow-up appointment.  Return to clinic sooner if needed.

## 2024-01-26 ENCOUNTER — TELEPHONE (OUTPATIENT)
Dept: FAMILY MEDICINE | Facility: CLINIC | Age: 49
End: 2024-01-26
Payer: COMMERCIAL

## 2024-01-29 ENCOUNTER — TELEPHONE (OUTPATIENT)
Dept: FAMILY MEDICINE | Facility: CLINIC | Age: 49
End: 2024-01-29
Payer: COMMERCIAL

## 2024-01-29 NOTE — TELEPHONE ENCOUNTER
Patient returned previous call. Patient given results. Patient verbalized understanding. No additional questions at this time.     ----- Message from HALIE Hunt sent at 1/24/2024  8:41 AM CST -----  PLEASE CALL PATIENTS WITH RESULTS,   Chemistry, liver, kidney, electrolytes within acceptable range.  Cholesterol within acceptable range no treatment needed.  Urinalysis no sign of infection.  Traces of blood and protein.  Will repeat on next visit .  Blood count no sign of anemia or infection.  Thyroid function test normal.  No Treatment needed.  HIV and hep C no sign of infection.  Hemoglobin A1c 5.6% no sign of prediabetes or diabetes.  Keep up the good work.  Keep your follow-up appointment.  Return to clinic sooner if needed.

## 2024-02-09 ENCOUNTER — PROCEDURE VISIT (OUTPATIENT)
Dept: GYNECOLOGY | Facility: CLINIC | Age: 49
End: 2024-02-09
Payer: COMMERCIAL

## 2024-02-09 VITALS
SYSTOLIC BLOOD PRESSURE: 131 MMHG | SYSTOLIC BLOOD PRESSURE: 120 MMHG | DIASTOLIC BLOOD PRESSURE: 80 MMHG | WEIGHT: 191 LBS | DIASTOLIC BLOOD PRESSURE: 87 MMHG | RESPIRATION RATE: 20 BRPM | OXYGEN SATURATION: 100 % | BODY MASS INDEX: 30.86 KG/M2 | WEIGHT: 192 LBS | HEIGHT: 66 IN | TEMPERATURE: 99 F | BODY MASS INDEX: 30.7 KG/M2 | RESPIRATION RATE: 20 BRPM | HEART RATE: 68 BPM | HEIGHT: 66 IN | HEART RATE: 88 BPM | OXYGEN SATURATION: 99 % | TEMPERATURE: 99 F

## 2024-02-09 DIAGNOSIS — R87.612 PAPANICOLAOU SMEAR OF CERVIX WITH LOW GRADE SQUAMOUS INTRAEPITHELIAL LESION (LGSIL): Primary | ICD-10-CM

## 2024-02-09 DIAGNOSIS — R87.810 CERVICAL HIGH RISK HPV (HUMAN PAPILLOMAVIRUS) TEST POSITIVE: ICD-10-CM

## 2024-02-09 DIAGNOSIS — Z30.09 ENCOUNTER FOR COUNSELING REGARDING CONTRACEPTION: Primary | ICD-10-CM

## 2024-02-09 LAB
B-HCG UR QL: NEGATIVE
CTP QC/QA: YES

## 2024-02-09 PROCEDURE — 57454 BX/CURETT OF CERVIX W/SCOPE: CPT | Mod: PBBFAC

## 2024-02-09 PROCEDURE — 88305 TISSUE EXAM BY PATHOLOGIST: CPT | Mod: TC

## 2024-02-09 PROCEDURE — 81025 URINE PREGNANCY TEST: CPT | Mod: PBBFAC

## 2024-02-09 NOTE — PROCEDURES
Colposcopy    Date/Time: 2024 8:00 AM    Performed by: Elva Booth MD  Authorized by: Marty Martinez MD    Consent obatined:  Prior to procedure the appropriate consent was completed and verified  Timeout:Immediately prior to procedure a time out was called to verify the correct patient, procedure, equipment, support staff and site/side marked as required    Colposcopy Site:  Cervix  Position:  Supine  Acrowhite Lesion? Yes (acetowhite lesions at 12 o clock, 1 o clock, 3 o clock and 9 o clock position)    Atypical Vessels: No    Transformation Zone Adequate?: No    Biopsy?: Yes         Location:  Cervix ((1 00, 12 00, 3 00 and 9 00))  ECC Performed?: Yes    LEEP Performed?: No    Estimated blood loss (cc):  10   Patient tolerated the procedure well with no immediate complications.   Post-operative instructions were provided for the patient.   Patient was discharged and will follow up if any complications occur                                              [facility] COLPOSCOPY CLINIC NOTE    Lavern Banegas is a 48 y.o.  referred to [facility] colposcopy clinic from [facility] due to abnormal pap smears.    Results of abnormal pap smear were discussed with patient. Indications/risks/benefits of colposcopy were discussed and consents were signed and witnessed prior to the procedure, all questions answered.    Pap/Colpo History:  2017  ASCUS, OHRHPV +. Colpo & ECC revealed scant endocervical epithelium and squamous metaplasia.  2.   NIL/OHRHPV +. Colpo and ECC revealed squamous metaplasia and squamous dysplasia. HGSIL unable to be ruled out.  3.   Biopsy at 6:00 and ECC done due to LSIL - benign ectocervical epithelium and endocervical tissue with immature squamous metaplasia.   4.  NIL, HRHPV negative. Endocervical mass excision - Endocervical mucosa with severe chronic cervicitis and squamous metaplasia.  5.   ASCUS, HR HPV +. Colpo and endocervical polypectomy - ECC benign.  6. 10/2023 - ASCUS  "HR HPV +, HPV 16/18/45 invalid  6. 2023 Pap repeated due to invalid HPV - LSIL, OHR HPV + and 18/45.      Sexual History:  Not currently sexually active  Contraception: Currently has a Nexplanon, previous use of OCP, Depo  Future fertility desires: postmenopausal  Smoking History: Never smoker   STD History: None  HIV status: Negative 2024  HPV vaccination status: Patient unsure if she ever received. She is now past recommended age for vaccine.  Family history: mother - ovarian cancer unsure of genetic testing.    GYN/OB History:  Last menstrual period: 2022  3       OB History:   OB History          3    Para   3    Term   3            AB        Living   3         SAB        IAB        Ectopic        Multiple        Live Births   3               Past Medical History:   Diagnosis Date    Abnormal Pap smear of cervix     Anemia     Hypertension      Past Surgical History:   Procedure Laterality Date    HERNIA REPAIR      INTRAUTERINE DEVICE INSERTION  2017    Nexplanon 68 inserted 2017 by [facility]    Nexplanon insertion N/A 2020    Performed at [facility], got specifics from nurse there.     Social History     Tobacco Use    Smoking status: Never    Smokeless tobacco: Never   Substance Use Topics    Alcohol use: Yes     Comment: occassionally    Drug use: Never         Objective:     /80   Pulse 88   Temp 98.6 °F (37 °C)   Resp 20   Ht 5' 6" (1.676 m)   Wt 87.1 kg (192 lb)   SpO2 100%   BMI 30.99 kg/m²   Physical Exam:  Gen: Well-nourished, well-developed female appearing stated age. Alert, cooperative, in no acute distress.    Urine Pregnancy Test: Negative    SEE COLPOSCOPY PROCEDURE NOTE    Assessment:       48 y.o.  here for:  1. Papanicolaou smear of cervix with low grade squamous intraepithelial lesion (LGSIL)  POCT Urine Pregnancy    Specimen to Pathology Gynecology and Obstetrics    CANCELED: Specimen to Pathology Gynecology and Obstetrics "      2. Cervical high risk HPV (human papillomavirus) test positive               Plan:     - Colposcopy inadequate.  - Precautions given to the patient to return to ED if vaginal bleeding, fevers, pain, or any other concerns. Patient expressed understanding and all questions were answered.  - Will contact patient with results and determine further follow up at that time.    Problem List Items Addressed This Visit    None  Visit Diagnoses       Papanicolaou smear of cervix with low grade squamous intraepithelial lesion (LGSIL)    -  Primary    Relevant Orders    POCT Urine Pregnancy (Completed)    Specimen to Pathology Gynecology and Obstetrics    Cervical high risk HPV (human papillomavirus) test positive              Discussed with Dr. Martinez who was present for the entire procedure.      Elva Booth MD  [facility] Family Medicine Resident, [facility]

## 2024-02-09 NOTE — PROGRESS NOTES
"  [facility] GYNECOLOGY CLINIC NOTE     Lavern Banegas is a 48 y.o.  presenting to GYN clinic for Nexplanon removal.    Patient has had Nexplanon for just shy of three years. Has been amenorrheic on therapy and is very satisfied with this method for contraception. She states that she know she will likely go through menopause at some point in the next 5 years, but is not ready to commit to another Nexplanon. She is also not interested in Depo Provera which she has tried before and states she is bad at taking daily pills. Discussed with patient that Nexplanon is FDA approved for 3 years, but had been proven effective for 5 years. Patient interested in keeping Nexplanon until 5 year roni.    Gynecology  OB History          3    Para   3    Term   3            AB        Living   3         SAB        IAB        Ectopic        Multiple        Live Births   3                Past Medical History:   Diagnosis Date    Abnormal Pap smear of cervix     Anemia     Hypertension       Past Surgical History:   Procedure Laterality Date    HERNIA REPAIR      INTRAUTERINE DEVICE INSERTION      Nexplanon 68 inserted 2017 by [facility]    Nexplanon insertion N/A 2020    Performed at [facility], got specifics from nurse there.      Current Outpatient Medications   Medication Instructions    amLODIPine (NORVASC) 10 mg, Oral, Daily     Social History     Tobacco Use    Smoking status: Never    Smokeless tobacco: Never   Substance Use Topics    Alcohol use: Yes     Comment: occassionally    Drug use: Never       Review of Systems  Pertinent items are noted in HPI.     Objective:     /87 (BP Location: Left arm, Patient Position: Sitting, BP Method: Large (Automatic))   Pulse 68   Temp 98.7 °F (37.1 °C) (Oral)   Resp 20   Ht 5' 6" (1.676 m)   Wt 86.6 kg (191 lb)   SpO2 99%   BMI 30.83 kg/m²     Physical Exam:  Gen: Well-nourished, well-developed female appearing stated age. Alert, " cooperative, in no acute distress.  CV: regular rate  Chest: no increased work of breathing   Abdomen: Soft, non-tender, no masses.  Extrem: Extremities normal, atraumatic, non-tender calves.    Assessment:       48 y.o.  here for contraception counseling and Nexplanon Removal.    Plan:       Problem List Items Addressed This Visit    None  Visit Diagnoses       Encounter for counseling regarding contraception    -  Primary          Contraception counseling:  - Discussed with patient ability to keep Nexplanon inserted for 5 years for contraception despite FDA approval for 3 years   - Patient interested in maintaining current contraceptive method given satisfaction with therapy.  - Patient will call clinic if she changes her mind and desires Nexplanon removed, but we will continue therapy at present     Return to clinic prn and for annual WWE.    Discussed patient and plan with Dr. Wyatt.    Maryan Chang, DO  [facility] PGY-2

## 2024-02-18 ENCOUNTER — TELEPHONE (OUTPATIENT)
Dept: GYNECOLOGY | Facility: CLINIC | Age: 49
End: 2024-02-18
Payer: COMMERCIAL

## 2024-02-18 NOTE — TELEPHONE ENCOUNTER
Patient called regarding colposcopy results. Unable to reach patient x2.     Pap/Colpo History:  6/2017: ASCUS hrHPV+  8/2017: scant endocervical cells, squamous metaplasia  4/2019: NILM, hrHPV+  8/2019 ECC: squamous metaplasia and squamous dysplasia; high grade lesion cannot be ruled out  6/2020: colpo 6:00- superficial squamous ectocervical epithelium; ECC immature squamous epithelium  6/2021 pap: NILM hrHPV neg  6/2021 endocervical mass bx: chronic cervicitis and squamous metaplasia  6/2022: ASCUS HRHPV 18 +  7/2022 Colposcopy ECC benign endocervical mucosa; endocervical polyp 0 chronically inflamed endocervical polyp  11/2023  LSIL HR 18+    Most recent results:  1. Cervix, 12 o clock, biopsy:   - Low grade squamous intraepithelial lesion (LSIL).     2. Cervix, 1 o clock , biopsy:   - Low grade squamous intraepithelial lesion (LSIL).     3. Cervix, 3 o clock, biopsy:   - Low grade squamous intraepithelial lesion (LSIL).     4. Endocervix, curettage:   - Fragments of dysplastic squamous epithelium. Cannot rule out high grade dysplasia (HSIL).    5. Cervix, 9 o clock, biopsy:   - Low grade squamous intraepithelial lesion (LSIL).       Plan:  Per ASCCP guidelines, plan for treatment with excisional procedure- CKC.     Will attempt to reach patient again at a later time/date.     Plan discussed with Dr. Martinez.    Maryan Chang, DO  [facility] PGY-2

## 2024-02-22 NOTE — PROGRESS NOTES
I have reviewed the patient's medical history, physical exam, and diagnosis with treatment plan. The care provided was reasonable and necessary.    Janet Wyatt MS, MD  [facility]

## 2024-02-25 ENCOUNTER — TELEPHONE (OUTPATIENT)
Dept: GYNECOLOGY | Facility: CLINIC | Age: 49
End: 2024-02-25
Payer: COMMERCIAL

## 2024-02-26 NOTE — TELEPHONE ENCOUNTER
Patient called regarding colposcopy results. Patient explained that she is about to leave for plans with her friends- requested to be called back a different day to review results instead.     Pap/Colpo History:  6/2017: ASCUS hrHPV+  8/2017: scant endocervical cells, squamous metaplasia  4/2019: NILM, hrHPV+  8/2019 ECC: squamous metaplasia and squamous dysplasia; high grade lesion cannot be ruled out  6/2020: colpo 6:00- superficial squamous ectocervical epithelium; ECC immature squamous epithelium  6/2021 pap: NILM hrHPV neg  6/2021 endocervical mass bx: chronic cervicitis and squamous metaplasia  6/2022: ASCUS HRHPV 18 +  7/2022 Colposcopy ECC benign endocervical mucosa; endocervical polyp 0 chronically inflamed endocervical polyp  11/2023  LSIL HR 18+    Most recent results:  1. Cervix, 12 o clock, biopsy:   - Low grade squamous intraepithelial lesion (LSIL).     2. Cervix, 1 o clock , biopsy:   - Low grade squamous intraepithelial lesion (LSIL).     3. Cervix, 3 o clock, biopsy:   - Low grade squamous intraepithelial lesion (LSIL).     4. Endocervix, curettage:   - Fragments of dysplastic squamous epithelium. Cannot rule out high grade dysplasia (HSIL).    5. Cervix, 9 o clock, biopsy:   - Low grade squamous intraepithelial lesion (LSIL).       Plan:  Per ASCCP guidelines, plan for excisional procedure with CKC.     Will call patient to review results at a different time per her request.     Plan discussed with Dr. Martinez.    Maryan Chang,   [facility] PGY-2

## 2024-02-28 ENCOUNTER — TELEPHONE (OUTPATIENT)
Dept: GYNECOLOGY | Facility: CLINIC | Age: 49
End: 2024-02-28
Payer: COMMERCIAL

## 2024-02-29 ENCOUNTER — TELEPHONE (OUTPATIENT)
Dept: GYNECOLOGY | Facility: CLINIC | Age: 49
End: 2024-02-29
Payer: COMMERCIAL

## 2024-02-29 NOTE — TELEPHONE ENCOUNTER
Patient called regarding colposcopy results. Patient identity confirmed via 2 metrics before discussion of results.    Patient reports doing well since her procedure, no concerns.     Pap/Colpo History:  6/2017: ASCUS hrHPV+  8/2017: scant endocervical cells, squamous metaplasia  4/2019: NILM, hrHPV+  8/2019 ECC: squamous metaplasia and squamous dysplasia; high grade lesion cannot be ruled out  6/2020: colpo 6:00- superficial squamous ectocervical epithelium; ECC immature squamous epithelium  6/2021 pap: NILM hrHPV neg  6/2021 endocervical mass bx: chronic cervicitis and squamous metaplasia  6/2022: ASCUS HRHPV 18 +  7/2022 Colposcopy ECC benign endocervical mucosa; endocervical polyp 0 chronically inflamed endocervical polyp  11/2023  LSIL HR 18+    Most recent results:  1. Cervix, 12 o clock, biopsy:   - Low grade squamous intraepithelial lesion (LSIL).     2. Cervix, 1 o clock , biopsy:   - Low grade squamous intraepithelial lesion (LSIL).     3. Cervix, 3 o clock, biopsy:   - Low grade squamous intraepithelial lesion (LSIL).     4. Endocervix, curettage:   - Fragments of dysplastic squamous epithelium. Cannot rule out high grade dysplasia (HSIL).    5. Cervix, 9 o clock, biopsy:   - Low grade squamous intraepithelial lesion (LSIL).     Plan:  Per ASCCP guidelines, plan for excisional procedure with CKC. Patient desires hysterectomy as definitive surgical management, and does not want to proceed with any more colposcopies going forward.     Patient verbalized understanding and agreed with plan. All questions answered.    Plan discussed with Dr. Martinez.    Maryan Chang, DO  [facility] PGY-2

## 2024-02-29 NOTE — TELEPHONE ENCOUNTER
Called pt to discuss scheduling surgery. No answer x2, VM left with clinic callback #.    Kathy Abdi MD, MPH  [facility], PGY4

## 2024-03-07 ENCOUNTER — TELEPHONE (OUTPATIENT)
Dept: GYNECOLOGY | Facility: CLINIC | Age: 49
End: 2024-03-07
Payer: COMMERCIAL

## 2024-03-07 NOTE — TELEPHONE ENCOUNTER
Patient returning call she missed on 2/29/24. It looks like we are trying to schedule patients CK.     Please advise. Thank you.

## 2024-03-09 ENCOUNTER — TELEPHONE (OUTPATIENT)
Dept: GYNECOLOGY | Facility: CLINIC | Age: 49
End: 2024-03-09
Payer: COMMERCIAL

## 2024-03-09 DIAGNOSIS — N87.9 CERVICAL DYSPLASIA: Primary | ICD-10-CM

## 2024-03-09 NOTE — TELEPHONE ENCOUNTER
Called patient to discuss surgical planning for cervical dysplasia with CKC.  Discussed CKC for 4/16/2024. Case request placed.  Sent message for pre-op to be scheduled on 3/27 at 8:30 AM.  Chad Castaneda MD PGY3  Obstetrics & Gynecology

## 2024-03-11 ENCOUNTER — TELEPHONE (OUTPATIENT)
Dept: GYNECOLOGY | Facility: CLINIC | Age: 49
End: 2024-03-11
Payer: COMMERCIAL

## 2024-03-11 NOTE — TELEPHONE ENCOUNTER
----- Message from Chad Castaneda MD sent at 3/9/2024  4:39 PM CST -----  Regarding: Pre-op visit  Hello,  This patient is for a CKC on 4/16. Can you get her in for Pre-op visit on 3/27/2024 at 8:30 AM?  Thank you.  Chad Castaneda MD PGY3  Obstetrics & Gynecology

## 2024-03-25 NOTE — H&P (VIEW-ONLY)
[facility] Preoperative Visit History and Physical    HPI:   Lavern Banegas 48 y.o.  with a history of cervical dysplasia with HSIL in the endocervix and inadequate colposcopy with inability to visualize the transformation zone.     She denies any concerns today. She reports she desires definitive surgical management in the setting of this cervical dysplasia    Pap History:  2017: scant endocervical cells, squamous metaplasia  2019: NILM, hrHPV+  2019 ECC: squamous metaplasia and squamous dysplasia; high grade lesion cannot be ruled out  2020: colpo 6:00- superficial squamous ectocervical epithelium; ECC immature squamous epithelium  2021 pap: NILM hrHPV neg  2021 endocervical mass bx: chronic cervicitis and squamous metaplasia  2022: ASCUS HRHPV 18 +  2022 Colposcopy ECC benign endocervical mucosa; endocervical polyp 0 chronically inflamed endocervical polyp  2023  LSIL OHR HPV + /HPV 18+  2024 colposcopy LSIL at 12,1,3, and 9 o'clock and HSIL in ECC    GynHx:  Triad: 16/IR  LMP: amenorrheic  Contraception: Nexplanon     PMH:  Past Medical History:   Diagnosis Date    Abnormal Pap smear of cervix     Anemia     Hypertension      ObHx:  OB History    Para Term  AB Living   3 3 3     3   SAB IAB Ectopic Multiple Live Births           3      # Outcome Date GA Lbr Nathaniel/2nd Weight Sex Delivery Anes PTL Lv   3 Term 2006    M Vag-Spont  N ANDREZ   2 Term     F Vag-Spont  N ANDREZ   1 Term     M Vag-Spont  N ANDREZ      Obstetric Comments    x3   BB: [id number]     SurgHx:  Past Surgical History:   Procedure Laterality Date    HERNIA REPAIR      Umbilical hernia repair (as a child)    INTRAUTERINE DEVICE INSERTION  2017    Nexplanon 68 inserted 2017 by [facility]    Nexplanon insertion N/A 2020    Performed at [facility], got specifics from nurse there.     FamHx:  Family History   Problem Relation Age of Onset    Cancer Mother     Ovarian cancer Mother      Hypertension Father    Denies history of breast, endometrial, colon cancers.    SocialHx:  Social History     Socioeconomic History    Marital status:     Highest education level: 12th grade   Tobacco Use    Smoking status: Never     Passive exposure: Never    Smokeless tobacco: Never   Substance and Sexual Activity    Alcohol use: Yes     Comment: occassionally    Drug use: Never    Sexual activity: Not Currently     Partners: Male     Birth control/protection: Implant     Social Determinants of Health     Financial Resource Strain: Low Risk  (6/22/2022)    Overall Financial Resource Strain (CARDIA)     Difficulty of Paying Living Expenses: Not very hard   Food Insecurity: Food Insecurity Present (6/22/2022)    Hunger Vital Sign     Worried About Running Out of Food in the Last Year: Sometimes true     Ran Out of Food in the Last Year: Sometimes true   Transportation Needs: No Transportation Needs (6/22/2022)    PRAPARE - Transportation     Lack of Transportation (Medical): No     Lack of Transportation (Non-Medical): No   Physical Activity: Sufficiently Active (6/22/2022)    Exercise Vital Sign     Days of Exercise per Week: 3 days     Minutes of Exercise per Session: 60 min   Recent Concern: Physical Activity - Insufficiently Active (5/30/2022)    Exercise Vital Sign     Days of Exercise per Week: 3 days     Minutes of Exercise per Session: 30 min   Stress: Stress Concern Present (6/22/2022)    [employer] - Occupational Stress Questionnaire     Feeling of Stress : To some extent   Social Connections: Moderately Integrated (6/22/2022)    Social Connection and Isolation Panel [NHANES]     Frequency of Communication with Friends and Family: Three times a week     Frequency of Social Gatherings with Friends and Family: Three times a week     Attends Mandaen Services: More than 4 times per year     Active Member of Clubs or Organizations: No     Attends Club or Organization  "Meetings: Never     Marital Status:    Housing Stability: Low Risk  (2022)    Housing Stability Vital Sign     Unable to Pay for Housing in the Last Year: No     Number of Places Lived in the Last Year: 1     Unstable Housing in the Last Year: No   Occasional alcohol use  Denies tobacco/illicit drug use.  Works as a  at [employer], supervises mostly    Lives with her 2 kids, and feels safe at home.    All:  NKDA  Review of patient's allergies indicates:  No Known Allergies    Meds:  Prior to Admission medications    Medication Sig Start Date End Date Taking? Authorizing Provider   amLODIPine (NORVASC) 10 MG tablet Take 1 tablet (10 mg total) by mouth once daily. 24   Radhika Conde FNP     Review of Systems: As stated in HPI.      Objective:     Vitals:    24 0911   BP: 125/83   Pulse: 74   Resp: 12   Temp: 98.2 °F (36.8 °C)   SpO2: 100%   Weight: 89.4 kg (197 lb 3.2 oz)   Height: 5' 5" (1.651 m)     Body mass index is 32.82 kg/m².    PHYSICAL EXAM  GEN: NAD, A&O x3  CV: RRR   LUNGS: CTABL  ABDOMEN: soft, NTND, no masses  INCISIONS: 4 cm infraumbilical incision  EXTREMITIES: atraumatic, no edema or cyanosis    LABS:  Last mammogram: 10/2023: Bi-RADS 1  Last pap: see pap history above  EMB: N/A  Colonoscopy: cologuard in  negative    Assessment:      48 y.o.  for surgical management of cervical dysplasia with HSIL on ECC and inadequate colposcopy with inability to visualize the transformation zone.    1. Cervical dysplasia  Type & Screen    US Pelvis Comp with Transvag NON-OB (xpd      2. Hypertension, unspecified type               Plan:     Counseling: This procedure and its risks, benefits, alternatives (such as LEEP) and complications (including injury to bowel, bladder, major blood vessel, ureter, bleeding, possibility of transfusion, infection, scarring, dyspareunia, erosion, further surgery, incontinence, failure of the procedure, or fistula formation) were " reviewed in detail. We reviewed risks and indication for cold knife conization and discussed the risk that not all of the abnormal cells are removed, and the possible indication for repeat excision or hysterectomy if repeat excision is not feasible. Patient was counseled regarding the rates of cervical dysplasia progression, persistence and regression. In the case of CIN3, 30% rate of regression without intervention and 10% progression to cancer without intervention. Patient was informed that pathology would be reviewed at her 2 week postop visit. Patient notes she is likely done with childbearing, however we did review that in the event she becomes pregnant there is an increased risk of cervical insufficiency,  labor, cervical stenosis, and low-birthweight  s/p CKC. We also reviewed patient's risk factors for cervical cancer. Discussed the Gardasil vaccine series is now approved for men and women up to age 45. Counseled that while it would not protect her from HPV strains she is already infected with, it can protect her from other low and high risk strains. We further discussed the recommendation for two dose HPV vaccination in both girls and boys as early as age 9 and she plans to have her children vaccinated.   Discussed with patient that even if after hysterectomy, will need to proceed with pap smears with 3 consecutive yearly pap smears, then spaced to every 3 years for a total of 25 years.  We have reviewed the typical perioperative course with hospital stay, and post-operative precautions and restrictions have been reviewed.    Patient counseled on the fact that cystotomy complicates approximately 0.3 to 1000 benign gynecologic surgeries.   Transfusion of blood and blood products discussed. Patient was consented for blood transfusion in the case of an emergency.  She understands the possibility of blood transfusion reaction and the attendant risk of transmission of HIV & Hepatitis C to be 1  in 2 million and the risk of Hepatitis B to be 1 in 200,000.  She is aware of the possibility of transfusion reaction. All questions were answered.   Patient understands we are affiliated with a teaching institution and residents and medical students will be involved in her care.  She has consented to an exam under anesthesia and understands that medical students participate in this portion of the procedure.  All questions were answered.    Preop testing ordered.  Surgery case requested. Surgical consents signed.  Instructions reviewed, including NPO after midnight.   Counseled to hold the following medications on the day of surgery: none   PAT appointment requested   Current contraception: Nexplanon    To OR for CKC with Dr. Wyatt   Scheduled on 4/16/2024    Discussed with Dr. Provost Chad Castaneda MD PGY3  Obstetrics & Gynecology   03/27/2024

## 2024-03-25 NOTE — PROGRESS NOTES
[facility] Preoperative Visit History and Physical    HPI:   Lavern Banegas 48 y.o.  with a history of cervical dysplasia with HSIL in the endocervix and inadequate colposcopy with inability to visualize the transformation zone.     She denies any concerns today. She reports she desires definitive surgical management in the setting of this cervical dysplasia    Pap History:  2017: scant endocervical cells, squamous metaplasia  2019: NILM, hrHPV+  2019 ECC: squamous metaplasia and squamous dysplasia; high grade lesion cannot be ruled out  2020: colpo 6:00- superficial squamous ectocervical epithelium; ECC immature squamous epithelium  2021 pap: NILM hrHPV neg  2021 endocervical mass bx: chronic cervicitis and squamous metaplasia  2022: ASCUS HRHPV 18 +  2022 Colposcopy ECC benign endocervical mucosa; endocervical polyp 0 chronically inflamed endocervical polyp  2023  LSIL OHR HPV + /HPV 18+  2024 colposcopy LSIL at 12,1,3, and 9 o'clock and HSIL in ECC    GynHx:  Triad: 16/IR  LMP: amenorrheic  Contraception: Nexplanon     PMH:  Past Medical History:   Diagnosis Date    Abnormal Pap smear of cervix     Anemia     Hypertension      ObHx:  OB History    Para Term  AB Living   3 3 3     3   SAB IAB Ectopic Multiple Live Births           3      # Outcome Date GA Lbr Nathaniel/2nd Weight Sex Delivery Anes PTL Lv   3 Term 2006    M Vag-Spont  N ANDREZ   2 Term     F Vag-Spont  N ANDREZ   1 Term     M Vag-Spont  N ANDREZ      Obstetric Comments    x3   BB: [id number]     SurgHx:  Past Surgical History:   Procedure Laterality Date    HERNIA REPAIR      Umbilical hernia repair (as a child)    INTRAUTERINE DEVICE INSERTION  2017    Nexplanon 68 inserted 2017 by [facility]    Nexplanon insertion N/A 2020    Performed at [facility], got specifics from nurse there.     FamHx:  Family History   Problem Relation Age of Onset    Cancer Mother     Ovarian cancer Mother      Hypertension Father    Denies history of breast, endometrial, colon cancers.    SocialHx:  Social History     Socioeconomic History    Marital status:     Highest education level: 12th grade   Tobacco Use    Smoking status: Never     Passive exposure: Never    Smokeless tobacco: Never   Substance and Sexual Activity    Alcohol use: Yes     Comment: occassionally    Drug use: Never    Sexual activity: Not Currently     Partners: Male     Birth control/protection: Implant     Social Determinants of Health     Financial Resource Strain: Low Risk  (6/22/2022)    Overall Financial Resource Strain (CARDIA)     Difficulty of Paying Living Expenses: Not very hard   Food Insecurity: Food Insecurity Present (6/22/2022)    Hunger Vital Sign     Worried About Running Out of Food in the Last Year: Sometimes true     Ran Out of Food in the Last Year: Sometimes true   Transportation Needs: No Transportation Needs (6/22/2022)    PRAPARE - Transportation     Lack of Transportation (Medical): No     Lack of Transportation (Non-Medical): No   Physical Activity: Sufficiently Active (6/22/2022)    Exercise Vital Sign     Days of Exercise per Week: 3 days     Minutes of Exercise per Session: 60 min   Recent Concern: Physical Activity - Insufficiently Active (5/30/2022)    Exercise Vital Sign     Days of Exercise per Week: 3 days     Minutes of Exercise per Session: 30 min   Stress: Stress Concern Present (6/22/2022)    [place] [employer] - Occupational Stress Questionnaire     Feeling of Stress : To some extent   Social Connections: Moderately Integrated (6/22/2022)    Social Connection and Isolation Panel [NHANES]     Frequency of Communication with Friends and Family: Three times a week     Frequency of Social Gatherings with Friends and Family: Three times a week     Attends Faith Services: More than 4 times per year     Active Member of Clubs or Organizations: No     Attends Club or Organization  "Meetings: Never     Marital Status:    Housing Stability: Low Risk  (2022)    Housing Stability Vital Sign     Unable to Pay for Housing in the Last Year: No     Number of Places Lived in the Last Year: 1     Unstable Housing in the Last Year: No   Occasional alcohol use  Denies tobacco/illicit drug use.  Works as a  at [email or web address], supervises mostly    Lives with her 2 kids, and feels safe at home.    All:  NKDA  Review of patient's allergies indicates:  No Known Allergies    Meds:  Prior to Admission medications    Medication Sig Start Date End Date Taking? Authorizing Provider   amLODIPine (NORVASC) 10 MG tablet Take 1 tablet (10 mg total) by mouth once daily. 24   Radhika Conde FNP     Review of Systems: As stated in HPI.      Objective:     Vitals:    24 0911   BP: 125/83   Pulse: 74   Resp: 12   Temp: 98.2 °F (36.8 °C)   SpO2: 100%   Weight: 89.4 kg (197 lb 3.2 oz)   Height: 5' 5" (1.651 m)     Body mass index is 32.82 kg/m².    PHYSICAL EXAM  GEN: NAD, A&O x3  CV: RRR   LUNGS: CTABL  ABDOMEN: soft, NTND, no masses  INCISIONS: 4 cm infraumbilical incision  EXTREMITIES: atraumatic, no edema or cyanosis    LABS:  Last mammogram: 10/2023: Bi-RADS 1  Last pap: see pap history above  EMB: N/A  Colonoscopy: cologuard in  negative    Assessment:      48 y.o.  for surgical management of cervical dysplasia with HSIL on ECC and inadequate colposcopy with inability to visualize the transformation zone.    1. Cervical dysplasia  Type & Screen    US Pelvis Comp with Transvag NON-OB (xpd      2. Hypertension, unspecified type               Plan:     Counseling: This procedure and its risks, benefits, alternatives (such as LEEP) and complications (including injury to bowel, bladder, major blood vessel, ureter, bleeding, possibility of transfusion, infection, scarring, dyspareunia, erosion, further surgery, incontinence, failure of the procedure, or fistula formation) were " reviewed in detail. We reviewed risks and indication for cold knife conization and discussed the risk that not all of the abnormal cells are removed, and the possible indication for repeat excision or hysterectomy if repeat excision is not feasible. Patient was counseled regarding the rates of cervical dysplasia progression, persistence and regression. In the case of CIN3, 30% rate of regression without intervention and 10% progression to cancer without intervention. Patient was informed that pathology would be reviewed at her 2 week postop visit. Patient notes she is likely done with childbearing, however we did review that in the event she becomes pregnant there is an increased risk of cervical insufficiency,  labor, cervical stenosis, and low-birthweight  s/p CKC. We also reviewed patient's risk factors for cervical cancer. Discussed the Gardasil vaccine series is now approved for men and women up to age 45. Counseled that while it would not protect her from HPV strains she is already infected with, it can protect her from other low and high risk strains. We further discussed the recommendation for two dose HPV vaccination in both girls and boys as early as age 9 and she plans to have her children vaccinated.   Discussed with patient that even if after hysterectomy, will need to proceed with pap smears with 3 consecutive yearly pap smears, then spaced to every 3 years for a total of 25 years.  We have reviewed the typical perioperative course with hospital stay, and post-operative precautions and restrictions have been reviewed.    Patient counseled on the fact that cystotomy complicates approximately 0.3 to 1000 benign gynecologic surgeries.   Transfusion of blood and blood products discussed. Patient was consented for blood transfusion in the case of an emergency.  She understands the possibility of blood transfusion reaction and the attendant risk of transmission of HIV & Hepatitis C to be 1  in 2 million and the risk of Hepatitis B to be 1 in 200,000.  She is aware of the possibility of transfusion reaction. All questions were answered.   Patient understands we are affiliated with a teaching institution and residents and medical students will be involved in her care.  She has consented to an exam under anesthesia and understands that medical students participate in this portion of the procedure.  All questions were answered.    Preop testing ordered.  Surgery case requested. Surgical consents signed.  Instructions reviewed, including NPO after midnight.   Counseled to hold the following medications on the day of surgery: none   PAT appointment requested   Current contraception: Nexplanon    To OR for CKC with Dr. Wyatt   Scheduled on 4/16/2024    Discussed with Dr. Provost Chad Castaneda MD PGY3  Obstetrics & Gynecology   03/27/2024

## 2024-03-27 ENCOUNTER — OFFICE VISIT (OUTPATIENT)
Dept: GYNECOLOGY | Facility: CLINIC | Age: 49
End: 2024-03-27
Payer: COMMERCIAL

## 2024-03-27 VITALS
TEMPERATURE: 98 F | SYSTOLIC BLOOD PRESSURE: 125 MMHG | HEIGHT: 65 IN | RESPIRATION RATE: 12 BRPM | DIASTOLIC BLOOD PRESSURE: 83 MMHG | WEIGHT: 197.19 LBS | HEART RATE: 74 BPM | OXYGEN SATURATION: 100 % | BODY MASS INDEX: 32.85 KG/M2

## 2024-03-27 DIAGNOSIS — N87.9 CERVICAL DYSPLASIA: Primary | ICD-10-CM

## 2024-03-27 DIAGNOSIS — I10 HYPERTENSION, UNSPECIFIED TYPE: ICD-10-CM

## 2024-03-27 PROCEDURE — 99213 OFFICE O/P EST LOW 20 MIN: CPT | Mod: PBBFAC

## 2024-04-05 ENCOUNTER — ANESTHESIA EVENT (OUTPATIENT)
Dept: SURGERY | Facility: HOSPITAL | Age: 49
End: 2024-04-05
Payer: COMMERCIAL

## 2024-04-05 NOTE — ANESTHESIA PREPROCEDURE EVALUATION
Lavern Banegas is a 48 y.o. female PRESENTING FOR CONE BIOPSY, CERVIX, USING COLD KNIFE (Abdomen) with a history of -ABNL PAP/CERVICAL POLYP  -ANEMIA    Vitals:    04/16/24 0835 04/16/24 0837 04/16/24 0855 04/16/24 0922   BP:  (!) 147/88  129/86   BP Location:    Left arm   Patient Position:    Lying   Pulse:  83  70   Resp:   16 17   Temp:  36.8 °C (98.2 °F)  37.1 °C (98.8 °F)   TempSrc:  Oral  Temporal   SpO2:  97%  99%   Weight: 87.1 kg (192 lb)          -HTN  -OBESITY, BMI 32    BETA-BLOCKER: NONE    New Orders for Anesthesia: UPT NEG DOS; ERAS PO MEDS      Vitals:    04/16/24 0835 04/16/24 0837 04/16/24 0855 04/16/24 0922   BP:  (!) 147/88  129/86   BP Location:    Left arm   Patient Position:    Lying   Pulse:  83  70   Resp:   16 17   Temp:  36.8 °C (98.2 °F)  37.1 °C (98.8 °F)   TempSrc:  Oral  Temporal   SpO2:  97%  99%   Weight: 87.1 kg (192 lb)            Patient Active Problem List   Diagnosis    Cervical polyp    Abnormal uterine bleeding (AUB)    HTN (hypertension)    Annual physical exam    Encounter to establish care    Class 1 obesity due to excess calories with serious comorbidity and body mass index (BMI) of 32.0 to 32.9 in adult    Cervical dysplasia       Past Surgical History:   Procedure Laterality Date    HERNIA REPAIR      Umbilical hernia repair (as a child)    INTRAUTERINE DEVICE INSERTION  2017    Nexplanon 68 inserted 2017 by [facility]    Nexplanon insertion N/A 12/09/2020    Performed at [facility], got specifics from nurse there.       Lab Results   Component Value Date    WBC 5.76 01/23/2024    HGB 13.5 01/23/2024    HCT 42.8 01/23/2024     01/23/2024       CMP  Sodium Level   Date Value Ref Range Status   01/23/2024 141 136 - 145 mmol/L Final     Potassium Level   Date Value Ref Range Status   01/23/2024 4.0 3.5 - 5.1 mmol/L Final     Carbon Dioxide   Date Value Ref Range Status   01/23/2024 26 22 - 29 mmol/L Final     Blood Urea Nitrogen   Date Value Ref  Range Status   01/23/2024 9.0 (L) 9.8 - 20.1 mg/dL Final     Creatinine   Date Value Ref Range Status   01/23/2024 0.67 0.55 - 1.02 mg/dL Final     Calcium Level Total   Date Value Ref Range Status   01/23/2024 9.6 8.4 - 10.2 mg/dL Final     Albumin Level   Date Value Ref Range Status   01/23/2024 4.2 3.5 - 5.0 g/dL Final     Bilirubin Total   Date Value Ref Range Status   01/23/2024 0.4 <=1.5 mg/dL Final     Alkaline Phosphatase   Date Value Ref Range Status   01/23/2024 96 40 - 150 unit/L Final     Aspartate Aminotransferase   Date Value Ref Range Status   01/23/2024 14 5 - 34 unit/L Final     Alanine Aminotransferase   Date Value Ref Range Status   01/23/2024 17 0 - 55 unit/L Final     eGFR   Date Value Ref Range Status   01/23/2024 >60 mls/min/1.73/m2 Final       Current Outpatient Medications   Medication Instructions    amLODIPine (NORVASC) 10 mg, Oral, Daily       Past anesthesia records: NONE    Pre-op Assessment    I have reviewed the Patient Summary Reports.     I have reviewed the Nursing Notes. I have reviewed the NPO Status.   I have reviewed the Medications.     Review of Systems  Anesthesia Hx:  No problems with previous Anesthesia   History of prior surgery of interest to airway management or planning:          Denies Family Hx of Anesthesia complications.    Denies Personal Hx of Anesthesia complications.                    Hematology/Oncology:  Hematology Normal   Oncology Normal                                   EENT/Dental:  EENT/Dental Normal           Cardiovascular:  Cardiovascular Normal                                            Pulmonary:  Pulmonary Normal                       Renal/:  Renal/ Normal                 Hepatic/GI:  Hepatic/GI Normal                 Musculoskeletal:  Musculoskeletal Normal                Neurological:  Neurology Normal                                      Endocrine:  Endocrine Normal            Dermatological:  Skin Normal    Psych:  Psychiatric Normal                     Physical Exam  General: Well nourished, Cooperative, Alert and Oriented    Airway:  Mallampati: I / I  Mouth Opening: Normal  TM Distance: Normal  Tongue: Normal  Neck ROM: Normal ROM    Dental:  Intact        Anesthesia Plan  Type of Anesthesia, risks & benefits discussed:    Anesthesia Type: Gen Supraglottic Airway  Intra-op Monitoring Plan: Standard ASA Monitors  Post Op Pain Control Plan: multimodal analgesia and IV/PO Opioids PRN  Induction:  IV  Airway Plan: Direct  Informed Consent: Informed consent signed with the Patient and all parties understand the risks and agree with anesthesia plan.  All questions answered. Patient consented to blood products? No  ASA Score: 2  Day of Surgery Review of History & Physical: H&P Update referred to the surgeon/provider.    Ready For Surgery From Anesthesia Perspective.     .

## 2024-04-16 ENCOUNTER — ANESTHESIA (OUTPATIENT)
Dept: SURGERY | Facility: HOSPITAL | Age: 49
End: 2024-04-16
Payer: COMMERCIAL

## 2024-04-16 ENCOUNTER — HOSPITAL ENCOUNTER (OUTPATIENT)
Facility: HOSPITAL | Age: 49
Discharge: HOME | End: 2024-04-16
Attending: OBSTETRICS & GYNECOLOGY | Admitting: OBSTETRICS & GYNECOLOGY
Payer: COMMERCIAL

## 2024-04-16 VITALS
RESPIRATION RATE: 18 BRPM | HEART RATE: 87 BPM | SYSTOLIC BLOOD PRESSURE: 147 MMHG | WEIGHT: 192 LBS | OXYGEN SATURATION: 100 % | BODY MASS INDEX: 31.95 KG/M2 | TEMPERATURE: 96 F | DIASTOLIC BLOOD PRESSURE: 79 MMHG

## 2024-04-16 DIAGNOSIS — Z98.890 S/P CONE BIOPSY OF CERVIX: Primary | ICD-10-CM

## 2024-04-16 DIAGNOSIS — N87.9 CERVICAL DYSPLASIA: ICD-10-CM

## 2024-04-16 LAB
ABORH RETYPE: NORMAL
B-HCG UR QL: NEGATIVE
CTP QC/QA: YES
GROUP & RH: NORMAL
INDIRECT COOMBS: NORMAL
SPECIMEN OUTDATE: NORMAL

## 2024-04-16 PROCEDURE — 88307 TISSUE EXAM BY PATHOLOGIST: CPT | Mod: TC | Performed by: OBSTETRICS & GYNECOLOGY

## 2024-04-16 PROCEDURE — 63600175 PHARM REV CODE 636 W HCPCS: Mod: JZ,JG | Performed by: OBSTETRICS & GYNECOLOGY

## 2024-04-16 PROCEDURE — 25000003 PHARM REV CODE 250: Performed by: NURSE ANESTHETIST, CERTIFIED REGISTERED

## 2024-04-16 PROCEDURE — 88305 TISSUE EXAM BY PATHOLOGIST: CPT | Mod: TC | Performed by: OBSTETRICS & GYNECOLOGY

## 2024-04-16 PROCEDURE — 81025 URINE PREGNANCY TEST: CPT | Performed by: NURSE PRACTITIONER

## 2024-04-16 PROCEDURE — 25000003 PHARM REV CODE 250: Performed by: SPECIALIST

## 2024-04-16 PROCEDURE — 37000009 HC ANESTHESIA EA ADD 15 MINS: Performed by: OBSTETRICS & GYNECOLOGY

## 2024-04-16 PROCEDURE — 63600175 PHARM REV CODE 636 W HCPCS: Performed by: NURSE ANESTHETIST, CERTIFIED REGISTERED

## 2024-04-16 PROCEDURE — 37000008 HC ANESTHESIA 1ST 15 MINUTES: Performed by: OBSTETRICS & GYNECOLOGY

## 2024-04-16 PROCEDURE — 27201423 OPTIME MED/SURG SUP & DEVICES STERILE SUPPLY: Performed by: OBSTETRICS & GYNECOLOGY

## 2024-04-16 PROCEDURE — D9220A PRA ANESTHESIA: Mod: CRNA,,, | Performed by: NURSE ANESTHETIST, CERTIFIED REGISTERED

## 2024-04-16 PROCEDURE — D9220A PRA ANESTHESIA: Mod: ANES,,, | Performed by: SPECIALIST

## 2024-04-16 PROCEDURE — 71000015 HC POSTOP RECOV 1ST HR: Performed by: OBSTETRICS & GYNECOLOGY

## 2024-04-16 PROCEDURE — 36000707: Performed by: OBSTETRICS & GYNECOLOGY

## 2024-04-16 PROCEDURE — 36000706: Performed by: OBSTETRICS & GYNECOLOGY

## 2024-04-16 PROCEDURE — 25000003 PHARM REV CODE 250: Performed by: OBSTETRICS & GYNECOLOGY

## 2024-04-16 PROCEDURE — 63600175 PHARM REV CODE 636 W HCPCS: Performed by: SPECIALIST

## 2024-04-16 PROCEDURE — 71000033 HC RECOVERY, INTIAL HOUR: Performed by: OBSTETRICS & GYNECOLOGY

## 2024-04-16 PROCEDURE — 86901 BLOOD TYPING SEROLOGIC RH(D): CPT

## 2024-04-16 PROCEDURE — 71000016 HC POSTOP RECOV ADDL HR: Performed by: OBSTETRICS & GYNECOLOGY

## 2024-04-16 RX ORDER — LIDOCAINE HYDROCHLORIDE 20 MG/ML
INJECTION, SOLUTION EPIDURAL; INFILTRATION; INTRACAUDAL; PERINEURAL
Status: DISCONTINUED | OUTPATIENT
Start: 2024-04-16 | End: 2024-04-16

## 2024-04-16 RX ORDER — IBUPROFEN 600 MG/1
600 TABLET ORAL 3 TIMES DAILY
Qty: 30 TABLET | Refills: 0 | Status: SHIPPED | OUTPATIENT
Start: 2024-04-16

## 2024-04-16 RX ORDER — OXYCODONE HYDROCHLORIDE 5 MG/1
5 TABLET ORAL EVERY 4 HOURS PRN
Qty: 5 TABLET | Refills: 0 | Status: SHIPPED | OUTPATIENT
Start: 2024-04-16 | End: 2024-04-16

## 2024-04-16 RX ORDER — PROCHLORPERAZINE EDISYLATE 5 MG/ML
5 INJECTION INTRAMUSCULAR; INTRAVENOUS ONCE AS NEEDED
Status: ACTIVE | OUTPATIENT
Start: 2024-04-16 | End: 2035-09-13

## 2024-04-16 RX ORDER — SODIUM CHLORIDE, SODIUM LACTATE, POTASSIUM CHLORIDE, CALCIUM CHLORIDE 600; 310; 30; 20 MG/100ML; MG/100ML; MG/100ML; MG/100ML
INJECTION, SOLUTION INTRAVENOUS CONTINUOUS
Status: ACTIVE | OUTPATIENT
Start: 2024-04-16

## 2024-04-16 RX ORDER — FENTANYL CITRATE 50 UG/ML
INJECTION, SOLUTION INTRAMUSCULAR; INTRAVENOUS
Status: DISCONTINUED | OUTPATIENT
Start: 2024-04-16 | End: 2024-04-16

## 2024-04-16 RX ORDER — PROPOFOL 10 MG/ML
VIAL (ML) INTRAVENOUS
Status: DISCONTINUED | OUTPATIENT
Start: 2024-04-16 | End: 2024-04-16

## 2024-04-16 RX ORDER — VASOPRESSIN 20 [USP'U]/ML
INJECTION, SOLUTION INTRAMUSCULAR; SUBCUTANEOUS
Status: DISCONTINUED | OUTPATIENT
Start: 2024-04-16 | End: 2024-04-16 | Stop reason: HOSPADM

## 2024-04-16 RX ORDER — ACETAMINOPHEN 500 MG
500 TABLET ORAL EVERY 6 HOURS PRN
Qty: 60 TABLET | Refills: 0 | Status: SHIPPED | OUTPATIENT
Start: 2024-04-16

## 2024-04-16 RX ORDER — IPRATROPIUM BROMIDE AND ALBUTEROL SULFATE 2.5; .5 MG/3ML; MG/3ML
3 SOLUTION RESPIRATORY (INHALATION) ONCE AS NEEDED
Status: ACTIVE | OUTPATIENT
Start: 2024-04-16 | End: 2035-09-13

## 2024-04-16 RX ORDER — MEPERIDINE HYDROCHLORIDE 25 MG/ML
12.5 INJECTION INTRAMUSCULAR; INTRAVENOUS; SUBCUTANEOUS ONCE
Status: ACTIVE | OUTPATIENT
Start: 2024-04-16 | End: 2024-04-17

## 2024-04-16 RX ORDER — TRAMADOL HYDROCHLORIDE 50 MG/1
50 TABLET ORAL
Status: COMPLETED | OUTPATIENT
Start: 2024-04-16 | End: 2024-04-16

## 2024-04-16 RX ORDER — ONDANSETRON HYDROCHLORIDE 2 MG/ML
INJECTION, SOLUTION INTRAMUSCULAR; INTRAVENOUS
Status: DISCONTINUED | OUTPATIENT
Start: 2024-04-16 | End: 2024-04-16

## 2024-04-16 RX ORDER — GABAPENTIN 300 MG/1
600 CAPSULE ORAL
Status: COMPLETED | OUTPATIENT
Start: 2024-04-16 | End: 2024-04-16

## 2024-04-16 RX ORDER — EPHEDRINE SULFATE 50 MG/ML
INJECTION, SOLUTION INTRAVENOUS
Status: DISCONTINUED | OUTPATIENT
Start: 2024-04-16 | End: 2024-04-16

## 2024-04-16 RX ORDER — DEXAMETHASONE SODIUM PHOSPHATE 4 MG/ML
INJECTION, SOLUTION INTRA-ARTICULAR; INTRALESIONAL; INTRAMUSCULAR; INTRAVENOUS; SOFT TISSUE
Status: DISCONTINUED | OUTPATIENT
Start: 2024-04-16 | End: 2024-04-16

## 2024-04-16 RX ORDER — DIPHENHYDRAMINE HYDROCHLORIDE 50 MG/ML
25 INJECTION INTRAMUSCULAR; INTRAVENOUS ONCE AS NEEDED
Status: ACTIVE | OUTPATIENT
Start: 2024-04-16 | End: 2035-09-13

## 2024-04-16 RX ORDER — OXYCODONE HYDROCHLORIDE 5 MG/1
5 TABLET ORAL EVERY 4 HOURS PRN
Qty: 5 TABLET | Refills: 0 | Status: SHIPPED | OUTPATIENT
Start: 2024-04-16

## 2024-04-16 RX ORDER — HYDROMORPHONE HYDROCHLORIDE 1 MG/ML
0.5 INJECTION, SOLUTION INTRAMUSCULAR; INTRAVENOUS; SUBCUTANEOUS EVERY 5 MIN PRN
Status: ACTIVE | OUTPATIENT
Start: 2024-04-16

## 2024-04-16 RX ORDER — ONDANSETRON HYDROCHLORIDE 2 MG/ML
4 INJECTION, SOLUTION INTRAVENOUS ONCE
Status: COMPLETED | OUTPATIENT
Start: 2024-04-16 | End: 2024-04-16

## 2024-04-16 RX ORDER — OXYCODONE AND ACETAMINOPHEN 5; 325 MG/1; MG/1
2 TABLET ORAL ONCE
Status: COMPLETED | OUTPATIENT
Start: 2024-04-16 | End: 2024-04-16

## 2024-04-16 RX ORDER — ACETAMINOPHEN 500 MG
1000 TABLET ORAL
Status: COMPLETED | OUTPATIENT
Start: 2024-04-16 | End: 2024-04-16

## 2024-04-16 RX ORDER — MIDAZOLAM HYDROCHLORIDE 2 MG/2ML
2 INJECTION, SOLUTION INTRAMUSCULAR; INTRAVENOUS ONCE
Status: COMPLETED | OUTPATIENT
Start: 2024-04-16 | End: 2024-04-16

## 2024-04-16 RX ORDER — SENNOSIDES 8.6 MG/1
1 TABLET ORAL 2 TIMES DAILY PRN
Qty: 7 TABLET | Refills: 0 | Status: SHIPPED | OUTPATIENT
Start: 2024-04-16

## 2024-04-16 RX ORDER — KETOROLAC TROMETHAMINE 30 MG/ML
INJECTION, SOLUTION INTRAMUSCULAR; INTRAVENOUS
Status: DISCONTINUED | OUTPATIENT
Start: 2024-04-16 | End: 2024-04-16

## 2024-04-16 RX ORDER — HYDROMORPHONE HYDROCHLORIDE 1 MG/ML
0.2 INJECTION, SOLUTION INTRAMUSCULAR; INTRAVENOUS; SUBCUTANEOUS EVERY 5 MIN PRN
Status: ACTIVE | OUTPATIENT
Start: 2024-04-16

## 2024-04-16 RX ADMIN — TRAMADOL HYDROCHLORIDE 50 MG: 50 TABLET, COATED ORAL at 08:04

## 2024-04-16 RX ADMIN — PROPOFOL 200 MG: 10 INJECTION, EMULSION INTRAVENOUS at 11:04

## 2024-04-16 RX ADMIN — GABAPENTIN 600 MG: 300 CAPSULE ORAL at 08:04

## 2024-04-16 RX ADMIN — ACETAMINOPHEN 1000 MG: 500 TABLET ORAL at 08:04

## 2024-04-16 RX ADMIN — MIDAZOLAM HYDROCHLORIDE 2 MG: 1 INJECTION, SOLUTION INTRAMUSCULAR; INTRAVENOUS at 09:04

## 2024-04-16 RX ADMIN — KETOROLAC TROMETHAMINE 30 MG: 30 INJECTION, SOLUTION INTRAMUSCULAR at 11:04

## 2024-04-16 RX ADMIN — LIDOCAINE HYDROCHLORIDE 50 MG: 20 INJECTION, SOLUTION EPIDURAL; INFILTRATION; INTRACAUDAL; PERINEURAL at 11:04

## 2024-04-16 RX ADMIN — OXYCODONE HYDROCHLORIDE AND ACETAMINOPHEN 2 TABLET: 5; 325 TABLET ORAL at 01:04

## 2024-04-16 RX ADMIN — SODIUM CHLORIDE, POTASSIUM CHLORIDE, SODIUM LACTATE AND CALCIUM CHLORIDE: 600; 310; 30; 20 INJECTION, SOLUTION INTRAVENOUS at 09:04

## 2024-04-16 RX ADMIN — ONDANSETRON 4 MG: 2 INJECTION INTRAMUSCULAR; INTRAVENOUS at 01:04

## 2024-04-16 RX ADMIN — DEXAMETHASONE SODIUM PHOSPHATE 8 MG: 4 INJECTION, SOLUTION INTRA-ARTICULAR; INTRALESIONAL; INTRAMUSCULAR; INTRAVENOUS; SOFT TISSUE at 11:04

## 2024-04-16 RX ADMIN — ONDANSETRON 4 MG: 2 INJECTION INTRAMUSCULAR; INTRAVENOUS at 11:04

## 2024-04-16 RX ADMIN — EPHEDRINE SULFATE 50 MG: 50 INJECTION INTRAVENOUS at 11:04

## 2024-04-16 RX ADMIN — FENTANYL CITRATE 100 MCG: 50 INJECTION, SOLUTION INTRAMUSCULAR; INTRAVENOUS at 11:04

## 2024-04-16 NOTE — ANESTHESIA PROCEDURE NOTES
Intubation    Date/Time: 4/16/2024 11:24 AM    Performed by: Pancho Dugan CRNA  Authorized by: Sabrina Cleary MD    Intubation:     Induction:  Intravenous    Intubated:  Postinduction    Mask Ventilation:  Easy mask    Attempts:  1    Attempted By:  Student    Method of Intubation:  Direct    Difficult Airway Encountered?: No      Complications:  None    Airway Device:  Supraglottic airway/LMA    Airway Device Size:  4.0    Placement Verified By:  Capnometry    Complicating Factors:  None    Findings Post-Intubation:  BS equal bilateral

## 2024-04-16 NOTE — INTERVAL H&P NOTE
[facility] Preop H&P Update     I have seen and examined the patient on the morning of her surgery  I attest that there are no changes in her medical history since the time the H&P was performed.     /86 (BP Location: Left arm, Patient Position: Lying)   Pulse 70   Temp 98.8 °F (37.1 °C) (Temporal)   Resp 17   Wt 87.1 kg (192 lb)   SpO2 99%   Breastfeeding No   BMI 31.95 kg/m²      A&O33, NAD  RRR  CTABL  Abdomen soft, non tender, non distended  Calf symmetric with no tenderness or cords     Denies fever/chill, nausea/vomiting, abdominal pain, chest pain, SOB.     She would like her mother, Radha, to be contacted after her surgery and the details of her surgery shared. Phone number [phone].  She was able to explain the procedure in her own words.  Postoperative care was briefly re-reviewed    She desires to proceed with surgery as planned this morning.    ERAS Protocol  DVT PPX: SCDs  ABX: None    TO OR for CKC for cervical dysplasia.     Ranjana Benoit MD  [facility], PGY-2

## 2024-04-16 NOTE — DISCHARGE SUMMARY
[facility]  Brief Operative Note and Discharge Summary    Surgery Date: 4/16/2024     Surgeons and Role:     * Thao Park MD - Primary     * Ranjana Benoit MD - Resident - Assisting    Pre-op Diagnosis: Cervical dyplasia    Post-op Diagnosis: Cervical dysplasia    Procedure: Cold knife conization of the cervix    Anesthesia: General    Operative Findings: Normal external female genitalia without lesion. Bimanual exam revealing 7 cm uterus, anteverted, mobile. 3 cm beneath pubic arch, 9 cm between ischial spines, moderate descent noted. No adnexal masses or irregularity to contour of uterus noted. Vaginal mucosa atrophic and cervix smooth, no lesions, abnormal discharge, or bleeding noted. Application of Lugol's revealing no area of decreased uptake.     Complications: None    IV fluids: 900 mL    Drain:  120 mL clear, yellow urine    Estimated Blood Loss: 5 mL         Specimens:   Specimen (24h ago, onward)       Start     Ordered    04/16/24 1155  Specimen to Pathology  RELEASE UPON ORDERING        References:    Click here for ordering Quick Tip   Question:  Release to patient  Answer:  Immediate    04/16/24 1155                      Discharge Note    OUTCOME: Patient tolerated treatment/procedure well without complication and is now ready for discharge.    DISPOSITION: Home or Self Care    FOLLOWUP: As scheduled with OBGYN team in two weeks    DISCHARGE INSTRUCTIONS:    Discharge Procedure Orders   Pelvic Rest     No driving until:   Order Comments: No longer requiring narcotics     Notify your health care provider if you experience any of the following:  temperature >100.4     Notify your health care provider if you experience any of the following:  persistent nausea and vomiting or diarrhea     Notify your health care provider if you experience any of the following:  severe uncontrolled pain     Notify your health care provider if you experience any of the following:   Order  Comments: Heavy vaginal bleeding soaking 1 pad in 1 hour for >2 hours     Activity as tolerated     Ranjana Benoit MD  [facility], PGY-2

## 2024-04-16 NOTE — DISCHARGE INSTRUCTIONS
PLEASE KEEP THESE POST OP INSTRUCTIONS WITH YOU UNTIL YOUR  FOLLOW-UP APPOINTMENT    Keep follow up appointment in [facility] on the 7th Floor in 2 weeks then again in 6     Pelvic rest: 4  weeks (no baths, soaking, tampons, nothing in vagina, no douches or intercourse).    No heavy lifting/straining:  weeks.    Moving around carefully is recommended.  Limit stairs.  You may shower: tomorrow  Light diet today- soup recommended.   Take pain medication as prescribed.  Alternate Tylenol with Ibuprofen and add oxycodone as needed for severe pain, as prescribed only.   If you are experiencing severe pain even with your pain medications, please call the Womens clinic at [phone] to notify your doctor.   Take over the counter laxatives such as Miralax for constipation.  Do not strain to have a bowel movement.   Brace belly with pillow when coughing/sneezing/deep breathing.   Apply ice pack to area as needed for pain.   No driving or consuming alcohol for the next 24 hours or while taking narcotic pain medicine.     Some bleeding/spotting is to be expected for several days.       Notify MD of moderate to severe pain unrelieved by pain medicine or for any signs of infection including fever above 100.4, yellow/green foul-smelling drainage, nausea or vomiting.  Clinics number: [phone], or after business hours or emergency call [phone].

## 2024-04-16 NOTE — INTERVAL H&P NOTE
The patient has been examined and the H&P has been reviewed:    I concur with the findings and no changes have occurred since H&P was written.    Surgery risks, benefits and alternative options discussed and understood by patient/family.    Thao Park MD  [facility]  Proceed with CKC for cervical dysplasia

## 2024-04-16 NOTE — TRANSFER OF CARE
Anesthesia Transfer of Care Note    Patient: Lavern Banegas    Procedure(s) Performed: Procedure(s) (LRB):  CONE BIOPSY, CERVIX, USING COLD KNIFE (N/A)    Patient location: PACU    Anesthesia Type: general    Transport from OR: Transported from OR on room air with adequate spontaneous ventilation    Post pain: adequate analgesia    Post assessment: no apparent anesthetic complications and tolerated procedure well    Post vital signs: stable    Level of consciousness: sedated    Nausea/Vomiting: no nausea/vomiting    Complications: none    Transfer of care protocol was followed      Last vitals: Visit Vitals  /86 (BP Location: Left arm, Patient Position: Lying)   Pulse 70   Temp 37.1 °C (98.8 °F) (Temporal)   Resp 17   Wt 87.1 kg (192 lb)   SpO2 99%   Breastfeeding No   BMI 31.95 kg/m²

## 2024-04-16 NOTE — OP NOTE
[facility]  Gynecology  Operative Note    SUMMARY     Surgery Date: 4/16/2024     Surgeons and Role:     * Thao Park MD - Primary     * Ranjana Benoit MD - Resident - Assisting    Pre-op Diagnosis: Cervical dyplasia    Post-op Diagnosis: Cervical dysplasia    Procedure: Cold knife conization of the cervix    Anesthesia: General    Operative Findings: Normal external female genitalia without lesion. Bimanual exam revealing 7 cm uterus, anteverted, mobile. 3 cm beneath pubic arch, 9 cm between ischial spines, moderate descent noted. No adnexal masses or irregularity to contour of uterus noted. Vaginal mucosa atrophic and cervix smooth, no lesions, abnormal discharge, or bleeding noted. Application of Lugol's revealing no area of decreased uptake.     Technique: The patient was taken to the OR with IV fluids running. SCDs were applied to the lower extremities. General anesthesia was administered without difficulty. She was positioned in the dorsal lithotomy position with [place]-type stirrups. EUA findings aforementioned. The patient was prepped and draped in the normal sterile fashion. A straight catheter was inserted to empty the bladder. A timeout was performed.    A weighted speculum and right angle retractor were placed into the vagina and the cervix was visualized. The anterior lip of the cervix was then grasped with a single-tooth tenaculum. Lugol's solution was applied with no area of decreased uptake. A total of 10 mL of vasopressin was injected at the cervix, distributed between the 2 o'clock, 4 o'clock, 8 o'clock, and 10 o'clock positions. Two stay sutures were placed using 2-0 Vicryl at the 3 and 9 o'clock positions. An 11 blade was used to obtain the cone specimen starting at the 6 o'clock position and moving in a counter-clockwise manner. The stay suture at 9 o'clock was incidentally noted to be cut.     The cervical cone specimen was then grasped using Allis clamps and  amputated using the scalpel. The specimen was tagged with silk suture at the 12 o'clock position. At this point an endocervical curettage was performed. Both specimens were then labeled and sent for pathology. Hemostasis was achieved using the Bovie with a roller ball tip. Monsel's solution and a surgicel sheet were then applied to the surgical bed.    The patient tolerated the procedure well. All instruments were removed from the vagina and all counts were correct times two. The patient was taken to the recovery room in a stable condition. There were no complications. Dr. Park was present for the entire procedure.    Complications: None    IV fluids: 900 mL    Drain:  120 mL clear, yellow urine    Estimated Blood Loss: 5 mL    Specimens:   Specimen (24h ago, onward)       Start     Ordered    04/16/24 1153  Specimen to Pathology  RELEASE UPON ORDERING        References:    Click here for ordering Quick Tip   Question:  Release to patient  Answer:  Immediate    04/16/24 9529                            Condition: Good    Disposition: PACU - hemodynamically stable.    Ranjana Benoit MD  [facility], PGY-2

## 2024-04-24 ENCOUNTER — OFFICE VISIT (OUTPATIENT)
Dept: FAMILY MEDICINE | Facility: CLINIC | Age: 49
End: 2024-04-24
Payer: COMMERCIAL

## 2024-04-24 VITALS
RESPIRATION RATE: 18 BRPM | DIASTOLIC BLOOD PRESSURE: 88 MMHG | SYSTOLIC BLOOD PRESSURE: 131 MMHG | WEIGHT: 195 LBS | BODY MASS INDEX: 32.49 KG/M2 | OXYGEN SATURATION: 98 % | TEMPERATURE: 98 F | HEIGHT: 65 IN | HEART RATE: 78 BPM

## 2024-04-24 DIAGNOSIS — I10 PRIMARY HYPERTENSION: Primary | ICD-10-CM

## 2024-04-24 DIAGNOSIS — Z12.31 ENCOUNTER FOR SCREENING MAMMOGRAM FOR BREAST CANCER: ICD-10-CM

## 2024-04-24 PROCEDURE — 99213 OFFICE O/P EST LOW 20 MIN: CPT | Mod: S$PBB,,, | Performed by: NURSE PRACTITIONER

## 2024-04-24 PROCEDURE — 99215 OFFICE O/P EST HI 40 MIN: CPT | Mod: PBBFAC | Performed by: NURSE PRACTITIONER

## 2024-04-24 RX ORDER — AMLODIPINE BESYLATE 10 MG/1
10 TABLET ORAL DAILY
Qty: 90 TABLET | Refills: 3 | Status: SHIPPED | OUTPATIENT
Start: 2024-04-24

## 2024-04-24 NOTE — ASSESSMENT & PLAN NOTE
Controlled, blood pressure 131/88.  Patient state she is benefitting from Norvasc 10 mg p.o. once daily and would like to continue.  Patient denies any side effects from medication.  Medication refilled and sent to preferred pharmacy.  Follow low-salt diet, stay physically active, stay hydrated with water.  Patient verbalized.

## 2024-04-24 NOTE — PROGRESS NOTES
"Patient Name: Lavern Banegas   : 1975  MRN: [id number]     SUBJECTIVE DATA:    CHIEF COMPLAINT:   Lavern Bnaegas is a 48 y.o. female who presents to clinic today with Hypertension        HPI: Lavern Banegas 48 year old female presents to the clinic to follow-up on hypertension medication efficacy.    2024:    Hypertension:  Controlled, blood pressure 133/85, apical pulse 74.  Patient currently takes Norvasc 10 mg p.o. once daily.  Patient state she is compliant with medication, benefitting from dose and denying any side effects.  Discussed weight loss, follow low-salt intake, high-fiber intake, patient to read discharge education materials.  Patient to return in 3 months for follow-up.  Return sooner if needed    2024:  Hypertension:  Controlled, blood pressure 131/88.  Patient state she is benefitting from Norvasc 10 mg p.o. once daily and would like to continue.  Patient denies any side effects from medication.  Medication refilled and sent to preferred pharmacy.  Follow low-salt diet, stay physically active, stay hydrated with water.  Patient verbalized.    Patient denies chest pain, shortness of breath, dyspnea on exertion, palpitations, peripheral edema, abdominal pain, nausea, vomiting, diarrhea, constipation, fatigue, fever, chills, dysuria,  hematuria, melena, or hematochezia.        ALLERGIES: Review of patient's allergies indicates:  No Known Allergies      ROS:  Review of Systems   All other systems reviewed and are negative.        OBJECTIVE DATA:  Vital signs  Vitals:    24 0839   BP: 131/88   Pulse: 78   Resp: 18   Temp: 98.4 °F (36.9 °C)   TempSrc: Oral   SpO2: 98%   Weight: 88.5 kg (195 lb)   Height: 5' 5" (1.651 m)      Body mass index is 32.45 kg/m².    PHYSICAL EXAM:   Physical Exam  Vitals and nursing note reviewed.   Constitutional:       General: She is awake. She is not in acute distress.     Appearance: Normal appearance. She is well-developed and " well-groomed. She is obese. She is not ill-appearing, toxic-appearing or diaphoretic.   HENT:      Right Ear: Tympanic membrane, ear canal and external ear normal.      Left Ear: Tympanic membrane, ear canal and external ear normal.      Nose: Congestion present.      Right Sinus: No maxillary sinus tenderness or frontal sinus tenderness.      Left Sinus: No maxillary sinus tenderness or frontal sinus tenderness.      Comments: Bilateral boggy turbinates without erythema.  Patient prefer Allegra 180 mg p.o. once daily.  Also patient will utilize Flonase nasal spray that she has at home.  Denies fever or chills or body aches.     Mouth/Throat:      Mouth: Mucous membranes are moist.      Pharynx: Oropharynx is clear. Uvula midline.   Eyes:      Conjunctiva/sclera: Conjunctivae normal.      Pupils: Pupils are equal, round, and reactive to light.   Neck:      Trachea: Trachea and phonation normal.   Cardiovascular:      Rate and Rhythm: Normal rate and regular rhythm.      Pulses: Normal pulses.           Radial pulses are 2+ on the right side and 2+ on the left side.      Heart sounds: Normal heart sounds.   Pulmonary:      Effort: Pulmonary effort is normal.      Breath sounds: Normal breath sounds and air entry.   Musculoskeletal:         General: Normal range of motion.      Cervical back: Normal range of motion.   Lymphadenopathy:      Cervical: No cervical adenopathy.   Skin:     General: Skin is warm.      Capillary Refill: Capillary refill takes less than 2 seconds.   Neurological:      General: No focal deficit present.      Mental Status: She is alert and oriented to person, place, and time. Mental status is at baseline.      GCS: GCS eye subscore is 4. GCS verbal subscore is 5. GCS motor subscore is 6.      Cranial Nerves: No cranial nerve deficit.      Sensory: No sensory deficit.      Motor: No weakness.      Coordination: Coordination normal.      Gait: Gait normal.   Psychiatric:         Attention and  Perception: Attention and perception normal.         Mood and Affect: Mood and affect normal.         Behavior: Behavior normal. Behavior is cooperative.         Thought Content: Thought content normal.         Cognition and Memory: Cognition and memory normal.         Judgment: Judgment normal.          ASSESSMENT/PLAN:  1. Encounter for screening mammogram for breast cancer  -     Mammo Digital Screening [facility]/ [facility]; Future; Expected date: 10/28/2024    2. Primary hypertension  Assessment & Plan:  Controlled, blood pressure 131/88.  Patient state she is benefitting from Norvasc 10 mg p.o. once daily and would like to continue.  Patient denies any side effects from medication.  Medication refilled and sent to preferred pharmacy.  Follow low-salt diet, stay physically active, stay hydrated with water.  Patient verbalized.    Orders:  -     amLODIPine (NORVASC) 10 MG tablet; Take 1 tablet (10 mg total) by mouth once daily.  Dispense: 90 tablet; Refill: 3           RESULTS:  Recent Results (from the past 1008 hour(s))   Type & Screen    Collection Time: 04/16/24  8:43 AM   Result Value Ref Range    Group & Rh O POS     Indirect Carleen GEL NEG     Specimen Outdate 04/19/2024 23:59    ABORH RETYPE    Collection Time: 04/16/24  8:45 AM   Result Value Ref Range    ABORH Retype O POS    POCT urine pregnancy    Collection Time: 04/16/24  8:57 AM   Result Value Ref Range    POC Preg Test, Ur Negative Negative     Acceptable Yes    Specimen to Pathology    Collection Time: 04/16/24 11:32 AM   Result Value Ref Range    Case Report       [facility] Surgical Pathology                            Case: [id number]                                 Authorizing Provider:  Thao Park MD       Collected:           04/16/2024 11:32 AM          Ordering Location:     [facility] -       Received:            04/16/2024 01:23 PM                                 Periop Services                                            "                   Pathologist:           Orquidea Pritchett MD                                                        Specimens:   1) - Cervix, cervical cone, stitch @ 12:00                                                          2) - Endocervix, ECC                                                                       Final Diagnosis         1. Cervix, cervical cone biopsy:   - Low-grade to high grade squamous intraepithelial lesion (LSIL-HSIL). Surgical margins are negative for HSIL.          2. Endocervix, curettage:   - Fragments of benign endocervical mucosa.          Clinical Information       Procedure:  CONE BIOPSY, CERVIX, USING COLD KNIFE  Pre-op Diagnosis:  N87.9 - Cervical dysplasia [ICD-10-CM]  Post-op Diagnosis:  N87.9 - Cervical dysplasia [ICD-10-CM]      Microscopic Description       A microscopic examination was performed and the diagnosis reflects the findings.          Gross Description       1. Cervix, cervical cone, stitch @ 12:00:   Received in 10% neutral buffered formalin is a cone biopsy of cervix measuring 3.5 x 3.2 x 2.8 cm.  There is a stitch at the 12 o'clock position.  The surgical margin is inked.  The specimen is entirely submitted in cassettes a through E from the 12 o'clock position going clockwise.    2. Endocervix, ECC:   Received in 10% buffered neutral formalin are fragments of bloody mucoid material measuring in aggregate 10 x 8 x 2 mm.  Entirely submitted.         Report Footnotes       Unless the case is a "gross only" or additional testing only, the final diagnosis for each specimen is based on a microscopic examination of appropriate tissue sections.           Follow Up:  Follow up in about 9 months (around 1/27/2025).      Previous medical history/lab work/radiology reviewed and considered during medical management decisions.   Medication list reviewed and medication reconciliation performed.  Patient was provided  and care about his/her current diagnosis (es) and " medications including risk/benefit and side effects/adverse events, over the counter medication uses/doses, home self-care and contact precautions,  and red flags and indications for when to seek immediate medical attention.   Patient was advised to continue compliance with current medication list and medical recommendations.  Patient dvised continued compliance with recommended eating habits/ diets for medical conditions and exercise 150 minutes/ week (if possible) for medical condition (s).  Educational handouts and instructions on selected disease management in AVS (After Visit Summary).    All of the patient's questions were answered to patient's satisfaction.   The patient was receptive, expressed verbal understanding and agreement the above plan.          This note was created with the assistance of a voice recognition software or phone dictation. There may be transcription errors as a result of using this technology however minimal. Effort has been made to assure accuracy of transcription but any obvious errors or omissions should be clarified with the author of the document

## 2024-04-24 NOTE — PATIENT INSTRUCTIONS
Ajith Puckett,     If you are due for any health screening(s) below please notify me so we can arrange them to be ordered and scheduled. Most healthy patients at your age complete them, but you are free to accept or refuse.     If you can't do it, I'll definitely understand. If you can, I'd certainly appreciate it!    All of your core healthy metrics are met.

## 2024-04-25 ENCOUNTER — DOCUMENTATION ONLY (OUTPATIENT)
Dept: GYNECOLOGY | Facility: CLINIC | Age: 49
End: 2024-04-25
Payer: COMMERCIAL

## 2024-04-25 NOTE — PROGRESS NOTES
Pathology Conference      Surgery Date: 4/16/2024   Patient: Lavern Banegas   Pre-Op Diagnosis: Cervical dysplasia   Procedure: Cold knife conization of the cervix     Path:    1. Cervix, cervical cone biopsy:    - Low-grade to high grade squamous intraepithelial lesion (LSIL-HSIL). Surgical margins are negative for HSIL.        2. Endocervix, curettage:    - Fragments of benign endocervical mucosa.      Plan: Repeat pap/HPV testing at 6, 18, and 30 months s/p [facility]     Patient and plan discussed with Dr. Park.     Ranjana Benoit MD  [facility], PGY-2

## 2024-04-29 PROBLEM — Z00.00 ANNUAL PHYSICAL EXAM: Status: RESOLVED | Noted: 2024-01-23 | Resolved: 2024-04-29

## 2024-05-09 NOTE — ANESTHESIA POSTPROCEDURE EVALUATION
Anesthesia Post Evaluation    Patient: Lavern Banegas    Procedure(s) Performed: Procedure(s) (LRB):  CONE BIOPSY, CERVIX, USING COLD KNIFE (N/A)    Final Anesthesia Type: general      Patient location during evaluation: PACU  Patient participation: Yes- Able to Participate  Level of consciousness: awake and responds to stimulation  Post-procedure vital signs: reviewed and stable  Pain management: adequate  Airway patency: patent    PONV status at discharge: No PONV  Anesthetic complications: no      Cardiovascular status: blood pressure returned to baseline  Respiratory status: unassisted  Hydration status: euvolemic  Follow-up not needed.              Vitals Value Taken Time   /79 04/16/24 1400   Temp 35.7 °C (96.3 °F) 04/16/24 1400   Pulse 87 04/16/24 1400   Resp 18 04/16/24 1400   SpO2 100 % 04/16/24 1400         Event Time   Out of Recovery 12:35:00         Pain/Tio Score: No data recorded

## 2024-05-09 NOTE — ANESTHESIA POSTPROCEDURE EVALUATION
Anesthesia Post Evaluation    Patient: Lavern Banegas    Procedure(s) Performed: Procedure(s) (LRB):  CONE BIOPSY, CERVIX, USING COLD KNIFE (N/A)    [facility] Anesthesia Post Op Evaluation      Vitals Value Taken Time   /79 04/16/24 1400   Temp 35.7 °C (96.3 °F) 04/16/24 1400   Pulse 87 04/16/24 1400   Resp 18 04/16/24 1400   SpO2 100 % 04/16/24 1400         Event Time   Out of Recovery 12:35:00         Pain/Tio Score: No data recorded

## 2024-06-10 ENCOUNTER — OFFICE VISIT (OUTPATIENT)
Dept: GYNECOLOGY | Facility: CLINIC | Age: 49
End: 2024-06-10
Payer: COMMERCIAL

## 2024-06-10 VITALS
RESPIRATION RATE: 12 BRPM | DIASTOLIC BLOOD PRESSURE: 88 MMHG | HEART RATE: 80 BPM | BODY MASS INDEX: 33.02 KG/M2 | WEIGHT: 198.19 LBS | HEIGHT: 65 IN | SYSTOLIC BLOOD PRESSURE: 138 MMHG

## 2024-06-10 DIAGNOSIS — R82.90 FOUL SMELLING URINE: Primary | ICD-10-CM

## 2024-06-10 LAB
BACTERIA #/AREA URNS AUTO: ABNORMAL /HPF
BILIRUB SERPL-MCNC: ABNORMAL MG/DL
BILIRUB UR QL STRIP.AUTO: NEGATIVE
BLOOD URINE, POC: ABNORMAL
CLARITY UR: CLEAR
CLARITY, POC UA: CLEAR
COLOR UR AUTO: ABNORMAL
COLOR, POC UA: YELLOW
GLUCOSE UR QL STRIP: NEGATIVE
GLUCOSE UR QL STRIP: NORMAL
HGB UR QL STRIP: NEGATIVE
HYALINE CASTS #/AREA URNS LPF: ABNORMAL /LPF
KETONES UR QL STRIP: ABNORMAL
KETONES UR QL STRIP: NEGATIVE
LEUKOCYTE ESTERASE UR QL STRIP: NEGATIVE
LEUKOCYTE ESTERASE URINE, POC: ABNORMAL
NITRITE UR QL STRIP: NEGATIVE
NITRITE, POC UA: ABNORMAL
PH UR STRIP: 6 [PH]
PH, POC UA: 6
PROT UR QL STRIP: NEGATIVE
PROTEIN, POC: ABNORMAL
RBC #/AREA URNS AUTO: ABNORMAL /HPF
SP GR UR STRIP.AUTO: 1.02 (ref 1–1.03)
SPECIFIC GRAVITY, POC UA: 1.02
SQUAMOUS #/AREA URNS LPF: ABNORMAL /HPF
UROBILINOGEN UR STRIP-ACNC: NORMAL
UROBILINOGEN, POC UA: 0.2
WBC #/AREA URNS AUTO: ABNORMAL /HPF

## 2024-06-10 PROCEDURE — 81002 URINALYSIS NONAUTO W/O SCOPE: CPT | Mod: PBBFAC

## 2024-06-10 PROCEDURE — 81001 URINALYSIS AUTO W/SCOPE: CPT

## 2024-06-10 PROCEDURE — 99213 OFFICE O/P EST LOW 20 MIN: CPT | Mod: PBBFAC

## 2024-06-10 RX ORDER — NITROFURANTOIN 25; 75 MG/1; MG/1
100 CAPSULE ORAL 2 TIMES DAILY
Qty: 14 CAPSULE | Refills: 0 | Status: SHIPPED | OUTPATIENT
Start: 2024-06-10 | End: 2024-06-17

## 2024-06-10 NOTE — PROGRESS NOTES
[facility] GYNECOLOGY CLINIC NOTE     Lavern Banegas is a 48 y.o.  s/p CKC on 2024 presenting to GYN clinic for now 2 month postop follow up. Pt has been doing well but c/o foul smelling urine since the procedure. She denies any hematuria, dysuria, vaginal itching, or dryness, heavy vaginal bleeding.    OB History          3    Para   3    Term   3            AB        Living   3         SAB        IAB        Ectopic        Multiple        Live Births   3           Obstetric Comments    x3  BB: [id number]              Gyn Hx:  LMP: Amenorrheic  Contraception: Nexplanon   Denies any abnormal vaginal bleeding, abnormal discharge, abdominal pain, pelvic pain, vaginal dryness or pruritus, vaginal rash or lesions    Pap History:  2017: scant endocervical cells, squamous metaplasia  2019: NILM, hrHPV+  2019 ECC: squamous metaplasia and squamous dysplasia; high grade lesion cannot be ruled out  2020: colpo 6:00- superficial squamous ectocervical epithelium; ECC immature squamous epithelium  2021 pap: NILM hrHPV neg  2021 endocervical mass bx: chronic cervicitis and squamous metaplasia  2022: ASCUS HRHPV 18 +  2022 Colposcopy ECC benign endocervical mucosa; endocervical polyp 0 chronically inflamed endocervical polyp  2023  LSIL OHR HPV + /HPV 18+  2024 colposcopy LSIL at 12,1,3, and 9 o'clock and HSIL in ECC    Past Medical History:   Diagnosis Date    Abnormal Pap smear of cervix     Anemia       Past Surgical History:   Procedure Laterality Date    COLD KNIFE CONIZATION OF CERVIX N/A 2024    Procedure: CONE BIOPSY, CERVIX, USING COLD KNIFE;  Surgeon: Thao Park MD;  Location: [facility];  Service: OB/GYN;  Laterality: N/A;    HERNIA REPAIR      Umbilical hernia repair (as a child)    INTRAUTERINE DEVICE INSERTION  2017    Nexplanon 68 inserted 2017 by [facility]    Nexplanon insertion N/A 2020    Performed at [facility], got specifics from nurse  "there.      Current Outpatient Medications   Medication Instructions    acetaminophen (TYLENOL) 500 mg, Oral, Every 6 hours PRN    amLODIPine (NORVASC) 10 mg, Oral, Daily    ibuprofen (ADVIL,MOTRIN) 600 mg, Oral, 3 times daily    oxyCODONE (ROXICODONE) 5 mg, Oral, Every 4 hours PRN    SENNA 8.6 mg tablet 1 tablet, Oral, 2 times daily PRN     Social History     Tobacco Use    Smoking status: Never     Passive exposure: Never    Smokeless tobacco: Never   Substance Use Topics    Alcohol use: Yes     Comment: occassionally    Drug use: Never       Review of Systems  Pertinent items noted in HPI.    Objective:     Vitals:    06/10/24 0924   BP: 138/88   Pulse: 80   Resp: 12   Weight: 89.9 kg (198 lb 3.2 oz)   Height: 5' 5" (1.651 m)     Body mass index is 32.98 kg/m².    Physical Exam:   General: Alert and oriented, in no acute distress  Lungs: Breathing comfortably on room air, no conversational dyspnea  Heart: Regular rate, extremities well perfused  Abdomen: Soft, non-distended, non tender to palpation, no involuntary guarding, no rebound tenderness  Extremities: Atraumatic, non-edematous, no cords or calf tenderness, no significant calf/ankle edema  External genitalia: Normal female genitalia without lesion, discharge or tenderness. Normal appearing urethral meatus. Normal appearing external anus.  Bimanual Exam: Uterus ***cm in size, *** anteverted, freely mobile, smooth in contour, no masses. No cervical motion tenderness. No adnexal fullness/tenderness.  Speculum Exam: Vaginal mucosa pink and moist, without lesion. Scant, white discharge present in vaginal canal. Cervix well visualized, smooth in contour with no masses or lesions. Os normal in appearance without blood or discharge.   Note:  Female nurse chaperone present for entirety of exam.    Relevant Labs:   Lab Results   Component Value Date    WBC 5.76 01/23/2024    HGB 13.5 01/23/2024    HCT 42.8 01/23/2024    MCV 90.9 01/23/2024     01/23/2024 "       Pathology:  [facility] (2024)  1. Cervix, cervical cone biopsy:   - Low-grade to high grade squamous intraepithelial lesion (LSIL-HSIL). Surgical margins are negative for HSIL.         2. Endocervix, curettage:   - Fragments of benign endocervical mucosa.     Assessment:       48 y.o.  here for post op follow up and possible UTI. Pathology results reviewed and all patient questions addressed.     1. Foul smelling urine  POCT URINE DIPSTICK WITHOUT MICROSCOPE             Plan:     Repeat Pap/HPV testing at 6, 18, and 30 months s/p [facility]  Urine collected today; POCT urine dipstick with reflex to UA and UCx    Problem List Items Addressed This Visit    None  Visit Diagnoses       Foul smelling urine    -  Primary    Relevant Orders    POCT URINE DIPSTICK WITHOUT MICROSCOPE            Return to clinic in 6 months for repeat Pap Smear    Discussed patient and plan with Dr. Michelle Schultz, MS3

## 2024-06-10 NOTE — PROGRESS NOTES
"[facility] CLINIC NOTE  [facility]  [address] [place] [address]  Phone: [phone]  Fax: [phone]    Postoperative Follow-Up    Subjective:      Lavern Banegas is a 48 y.o.  s/p CKC on 2024 presenting to GYN clinic for now 2 month postop follow up. Pt has been doing well but c/o foul smelling urine since the procedure. She denies any hematuria, dysuria, vaginal itching, or dryness, heavy vaginal bleeding.    Pap History:  2017: scant endocervical cells, squamous metaplasia  2019: NILM, hrHPV+  2019 ECC: squamous metaplasia and squamous dysplasia; high grade lesion cannot be ruled out  2020: colpo 6:00- superficial squamous ectocervical epithelium; ECC immature squamous epithelium  2021 pap: NILM hrHPV neg  2021 endocervical mass bx: chronic cervicitis and squamous metaplasia  2022: ASCUS HRHPV 18 +  2022 Colposcopy ECC benign endocervical mucosa; endocervical polyp 0 chronically inflamed endocervical polyp  2023  LSIL OHR HPV + /HPV 18+  2024 colposcopy LSIL at 12,1,3, and 9 o'clock and HSIL in ECC  Patient's medications, allergies, past medical, surgical, social and family histories were reviewed and updated as appropriate.  2024 [facility]:     Review of Systems  Denies fevers, chills, headache, blurry vision, nausea, vomiting, dizziness, or syncope.   Denies chest pain, shortness of breath, RUQ pain, or calf pain.     Objective:     Vitals:    06/10/24 0924   BP: 138/88   Pulse: 80   Resp: 12   Weight: 89.9 kg (198 lb 3.2 oz)   Height: 5' 5" (1.651 m)       Physical Exam:     General: alert and oriented, in no acute distress  Lungs: clear to auscultation bilaterally, no conversational dyspnea  Heart: regular rate and rhythm  Abdomen: Soft, non-distended, non tender to palpation, no involuntary guarding, no rebound tenderness  Extremities: Normal, atraumatic, non-edematous, No cords or calf tenderness, No significant calf/ankle edema  External genitalia: Normal female " genitalia without lesion, discharge or tenderness  Bimanual Exam: Uterus small, mobile, non tender exam  Speculum Exam: Vaginal mucosa normal in appearance. Cervical bed well healed without erythema, friability. No cervical/vaginal masses or lesions.    Note: RN chaperone present for entirety of exam.    Pathology:  [facility] (2024)  1. Cervix, cervical cone biopsy:   - Low-grade to high grade squamous intraepithelial lesion (LSIL-HSIL). Surgical margins are negative for HSIL.         2. Endocervix, curettage:   - Fragments of benign endocervical mucosa.      Assessment:     Lavern Banegas is a 48 y.o.  2 months s/p [facility]. Doing well post-operatively.  Operative findings again reviewed. Pathology report discussed.     Plan:     Continue any current medications.  Wound care discussed.  Activity restrictions: none  Anticipated return to work: Now  Will need repeat pap at 6 months, 18 months, and 30 months  Foul Smelling Urine: Urine dip with nitrite+. Send for Cx. Tx with Macrobid    Patient and plan were discussed with Dr. Martinez.    RTC 10/2024 for 6 month pap.    Ion Savage MD  [facility]-PGY3  06/10/2024 1:22 PM

## 2024-09-25 ENCOUNTER — TELEPHONE (OUTPATIENT)
Dept: GYNECOLOGY | Facility: CLINIC | Age: 49
End: 2024-09-25
Payer: COMMERCIAL

## 2024-09-25 NOTE — TELEPHONE ENCOUNTER
-Called patient she will come in at 7:30am for urine sample ---- Message from Stephania Moya MD sent at 9/25/2024  7:16 AM CDT -----  Regarding: RE: Patient Question  We can bring her in for a nursing visit for a UA and urine culture if she's still having UTI symptoms.   If she does have a UTI, the culture will show us what antibiotics are the most appropriate to treat her with.     Thanks!  ----- Message -----  From: Rafa Harris  Sent: 9/23/2024   4:04 PM CDT  To: [facility] Gynecology Residents  Subject: Patient Question                                 The patient above called because she is still having problems with a bladder infection and does not feel that the medication worked and just wants to speak to someone about her symptoms and would like to see if there is something else that can be sent in for her.    She is still at work and if possible can she be called after 5pm.    Please advise, Thanks.

## 2024-09-26 ENCOUNTER — CLINICAL SUPPORT (OUTPATIENT)
Dept: GYNECOLOGY | Facility: CLINIC | Age: 49
End: 2024-09-26
Payer: COMMERCIAL

## 2024-09-26 DIAGNOSIS — R30.0 BURNING WITH URINATION: Primary | ICD-10-CM

## 2024-09-26 LAB
BACTERIA #/AREA URNS AUTO: ABNORMAL /HPF
BILIRUB UR QL STRIP.AUTO: NEGATIVE
CLARITY UR: ABNORMAL
COLOR UR AUTO: ABNORMAL
GLUCOSE UR QL STRIP: NORMAL
HGB UR QL STRIP: NEGATIVE
HYALINE CASTS #/AREA URNS LPF: ABNORMAL /LPF
KETONES UR QL STRIP: NEGATIVE
LEUKOCYTE ESTERASE UR QL STRIP: 250
NITRITE UR QL STRIP: NEGATIVE
PH UR STRIP: 6 [PH]
PROT UR QL STRIP: NEGATIVE
RBC #/AREA URNS AUTO: ABNORMAL /HPF
SP GR UR STRIP.AUTO: 1.02 (ref 1–1.03)
SQUAMOUS #/AREA URNS LPF: ABNORMAL /HPF
UROBILINOGEN UR STRIP-ACNC: NORMAL
WBC #/AREA URNS AUTO: ABNORMAL /HPF

## 2024-09-26 PROCEDURE — 87086 URINE CULTURE/COLONY COUNT: CPT

## 2024-09-26 PROCEDURE — 99211 OFF/OP EST MAY X REQ PHY/QHP: CPT | Mod: PBBFAC

## 2024-09-26 PROCEDURE — 87186 SC STD MICRODIL/AGAR DIL: CPT

## 2024-09-26 PROCEDURE — 81015 MICROSCOPIC EXAM OF URINE: CPT

## 2024-09-26 NOTE — PROGRESS NOTES
Pt came to give a clean catch urine per Dr. Park orders. Pt left in stable condition. Urine sent down to lab. KG, CMA

## 2024-09-28 LAB — BACTERIA UR CULT: ABNORMAL

## 2024-10-01 ENCOUNTER — TELEPHONE (OUTPATIENT)
Dept: GYNECOLOGY | Facility: CLINIC | Age: 49
End: 2024-10-01
Payer: COMMERCIAL

## 2024-10-01 DIAGNOSIS — N30.00 ACUTE CYSTITIS WITHOUT HEMATURIA: Primary | ICD-10-CM

## 2024-10-01 RX ORDER — SULFAMETHOXAZOLE AND TRIMETHOPRIM 800; 160 MG/1; MG/1
1 TABLET ORAL 2 TIMES DAILY
Qty: 6 TABLET | Refills: 0 | Status: SHIPPED | OUTPATIENT
Start: 2024-10-01 | End: 2024-10-04

## 2024-10-01 NOTE — TELEPHONE ENCOUNTER
MD attempted to contact patient regarding results of UA/Ucx; notable for E. Coli UTI.   No answer, HIPAA-compliant VM left.     Rx for Bactrim sent to pharmacy on file.       Stephania Moya MD   PGY2, Obstetrics & Gynecology   [facility]

## 2024-10-01 NOTE — TELEPHONE ENCOUNTER
Called , M for her to return call so that I can inform her of Dr message ----- Message from Stephania Moya sent at 10/1/2024  2:29 PM CDT -----  Regarding: RE: results  I tried to call her and left VM.   She has a UTI, and I sent antibiotics to her pharmacy on file. Please let her know if she calls back.       Thanks!  ----- Message -----  From: Jodi Schuster MA  Sent: 10/1/2024   8:03 AM CDT  To: [facility] Gynecology Residents  Subject: FW: results                                      Patient requesting her UA results. Please advise. Thanks!  ----- Message -----  From: Gregorio Schuster  Sent: 10/1/2024   7:57 AM CDT  To: [facility] Gynecology Clinical Support Staff  Subject: results                                          The above patient called and would like the results from her UA. Please advise, thanks

## 2024-10-04 ENCOUNTER — TELEPHONE (OUTPATIENT)
Dept: GYNECOLOGY | Facility: CLINIC | Age: 49
End: 2024-10-04
Payer: COMMERCIAL

## 2024-10-04 NOTE — TELEPHONE ENCOUNTER
Left message that she has UTI and  sent meds to [facility] on [address] on 10/1/24 ----- Message from Gregorio sent at 10/4/2024 11:10 AM CDT -----  Regarding: returned call  The above pt missed a call. She called back and asked if you could leave a vm with her results bc she is at work and her phone goes in her locker until after 5 pm. She also wants to know if there was a prescription sent out to [facility] for her because she received a message that she had a prescription ready for . Once again she is okay and gives permission to leave a voicemail with her results. Please advise, thanks

## 2024-10-04 NOTE — TELEPHONE ENCOUNTER
Calling patient to inform about her UA results. Patient did not answer at this time. LVM.      ----- Message from Stephania Moya sent at 10/1/2024  2:29 PM CDT -----  Regarding: RE: results  I tried to call her and left VM.   She has a UTI, and I sent antibiotics to her pharmacy on file. Please let her know if she calls back.       Thanks!  ----- Message -----  From: Jodi Schuster MA  Sent: 10/1/2024   8:03 AM CDT  To: [facility] Gynecology Residents  Subject: FW: results                                      Patient requesting her UA results. Please advise. Thanks!  ----- Message -----  From: Gregorio Schuster  Sent: 10/1/2024   7:57 AM CDT  To: [facility] Gynecology Clinical Support Staff  Subject: results                                          The above patient called and would like the results from her UA. Please advise, thanks

## 2024-12-11 ENCOUNTER — OFFICE VISIT (OUTPATIENT)
Dept: GYNECOLOGY | Facility: CLINIC | Age: 49
End: 2024-12-11
Payer: COMMERCIAL

## 2024-12-11 VITALS
TEMPERATURE: 99 F | SYSTOLIC BLOOD PRESSURE: 138 MMHG | WEIGHT: 196.38 LBS | OXYGEN SATURATION: 99 % | DIASTOLIC BLOOD PRESSURE: 88 MMHG | HEIGHT: 65 IN | HEART RATE: 68 BPM | BODY MASS INDEX: 32.72 KG/M2 | RESPIRATION RATE: 18 BRPM

## 2024-12-11 DIAGNOSIS — Z12.39 ENCOUNTER FOR SCREENING FOR MALIGNANT NEOPLASM OF BREAST, UNSPECIFIED SCREENING MODALITY: ICD-10-CM

## 2024-12-11 DIAGNOSIS — R82.998 DARK URINE: ICD-10-CM

## 2024-12-11 DIAGNOSIS — R87.619 ABNORMAL CERVICAL PAPANICOLAOU SMEAR, UNSPECIFIED ABNORMAL PAP FINDING: Primary | ICD-10-CM

## 2024-12-11 LAB
BACTERIA #/AREA URNS AUTO: ABNORMAL /HPF
BILIRUB UR QL STRIP.AUTO: NEGATIVE
CLARITY UR: CLEAR
COLOR UR AUTO: ABNORMAL
GLUCOSE UR QL STRIP: NORMAL
HGB UR QL STRIP: ABNORMAL
HYALINE CASTS #/AREA URNS LPF: ABNORMAL /LPF
KETONES UR QL STRIP: NEGATIVE
LEUKOCYTE ESTERASE UR QL STRIP: NEGATIVE
MUCOUS THREADS URNS QL MICRO: ABNORMAL /LPF
NITRITE UR QL STRIP: NEGATIVE
PH UR STRIP: 7.5 [PH]
PROT UR QL STRIP: NEGATIVE
RBC #/AREA URNS AUTO: ABNORMAL /HPF
SP GR UR STRIP.AUTO: 1.02 (ref 1–1.03)
SQUAMOUS #/AREA URNS LPF: ABNORMAL /HPF
UROBILINOGEN UR STRIP-ACNC: NORMAL
WBC #/AREA URNS AUTO: ABNORMAL /HPF

## 2024-12-11 PROCEDURE — 99214 OFFICE O/P EST MOD 30 MIN: CPT | Mod: PBBFAC

## 2024-12-11 PROCEDURE — 81001 URINALYSIS AUTO W/SCOPE: CPT

## 2024-12-11 PROCEDURE — 88174 CYTOPATH C/V AUTO IN FLUID: CPT

## 2024-12-11 NOTE — PROGRESS NOTES
[facility] NOTE     Lavern Banegas is a 49 y.o.  presenting to GYN clinic for repeat pap following [facility] for LSIL-HSIL lesion 2024. 2024. Plan is to repeat PAP, HPV testing at 6, 18, and 30 months. This correlates with 10/2024, 10/2025, and 10/2026.    Patient also reports having dark urine. She denies any dysuria, urinary urgency, or urinary frequency. She reports having regular water intake. No abdominal pain or recent fevers.     Pap History:  2017: scant endocervical cells, squamous metaplasia  2019: NILM, hrHPV+  2019 ECC: squamous metaplasia and squamous dysplasia; high grade lesion cannot be ruled out  2020: colpo 6:00- superficial squamous ectocervical epithelium; ECC immature squamous epithelium  2021 pap: NILM hrHPV neg  2021 endocervical mass bx: chronic cervicitis and squamous metaplasia  2022: ASCUS HRHPV 18 +  2022 Colposcopy ECC benign endocervical mucosa; endocervical polyp 0 chronically inflamed endocervical polyp  2023  LSIL OHR HPV + /HPV 18+  2024 colposcopy LSIL at 12,1,3, and 9 o'clock and HSIL in ECC  Patient's medications, allergies, past medical, surgical, social and family histories were reviewed and updated as appropriate.  2024 [facility]C:     Gynecology  OB History          3    Para   3    Term   3            AB        Living   3         SAB        IAB        Ectopic        Multiple        Live Births   3           Obstetric Comments    x3  BB: [id number]              Past Medical History:   Diagnosis Date    Abnormal Pap smear of cervix     Anemia       Past Surgical History:   Procedure Laterality Date    COLD KNIFE CONIZATION OF CERVIX N/A 2024    Procedure: CONE BIOPSY, CERVIX, USING COLD KNIFE;  Surgeon: Thao Park MD;  Location: [facility];  Service: OB/GYN;  Laterality: N/A;    HERNIA REPAIR      Umbilical hernia repair (as a child)    INTRAUTERINE DEVICE INSERTION  2017    Nexplanon 68 inserted 2017 by Juan  "[facility]    Nexplanon insertion N/A 2020    Performed at [facility], got specifics from nurse there.      Current Outpatient Medications   Medication Instructions    acetaminophen (TYLENOL) 500 mg, Oral, Every 6 hours PRN    amLODIPine (NORVASC) 10 mg, Oral, Daily    ibuprofen (ADVIL,MOTRIN) 600 mg, Oral, 3 times daily    oxyCODONE (ROXICODONE) 5 mg, Oral, Every 4 hours PRN    SENNA 8.6 mg tablet 1 tablet, Oral, 2 times daily PRN     Social History     Tobacco Use    Smoking status: Never     Passive exposure: Never    Smokeless tobacco: Never   Substance Use Topics    Alcohol use: Yes     Comment: occassionally    Drug use: Never       OB History    Para Term  AB Living   3 3 3 0 0 3   SAB IAB Ectopic Multiple Live Births   0 0 0 0 3      # Outcome Date GA Lbr Nathaniel/2nd Weight Sex Type Anes PTL Lv   3 Term 2006    M Vag-Spont  N ANDREZ   2 Term     F Vag-Spont  N ANDREZ   1 Term     M Vag-Spont  N ANDREZ      Obstetric Comments    x3   BB: [id number]       Review of Systems  Pertinent items are noted in HPI.     Objective:     /88 (BP Location: Left arm, Patient Position: Sitting)   Pulse 68   Temp 99 °F (37.2 °C) (Oral)   Resp 18   Ht 5' 5" (1.651 m)   Wt 89.1 kg (196 lb 6.4 oz)   SpO2 99%   BMI 32.68 kg/m²   Physical Exam:  Gen: Well-nourished, well-developed female appearing stated age. Alert, cooperative, in no acute distress  CV: rrr, no murmurs/rubs/gallops  Chest: ctabl, no wheezes/rales/rhonchi  External genitalia: Normal female genetalia without lesion, discharge or tenderness. I  Speculum Exam: Vaginal vault with small amount of white nonodorous discharge.  Cervical os scarred  Bimanual Exam: No cervical motion tenderness.  Uterus freely mobile.  Normal size uterus. No adnexal masses.  Nontender exam.   Note: RN chaperone present for entirety of exam.    Assessment:       49 y.o.  here for HSIL follow up .  1. Abnormal cervical Papanicolaou smear, unspecified " abnormal pap finding  Liquid-Based Pap Smear, Screening      2. Dark urine  Urinalysis, Reflex to Urine Culture    CANCELED: Urinalysis      3. Encounter for screening for malignant neoplasm of breast, unspecified screening modality  Mammo Digital Screening Bilat             Plan:     Repeat PAP October 2025.   Original CKC was done 4/16/24  Plan is to repeat PAP/HPV at 6, 18, and 30 months  Mammogram ordered  UA ordered given dark urine complaint     Problem List Items Addressed This Visit    None  Visit Diagnoses       Abnormal cervical Papanicolaou smear, unspecified abnormal pap finding    -  Primary    Relevant Orders    Liquid-Based Pap Smear, Screening    Dark urine        Relevant Orders    Urinalysis, Reflex to Urine Culture    Encounter for screening for malignant neoplasm of breast, unspecified screening modality        Relevant Orders    Mammo Digital Screening Bilat          Return to clinic 1 year w/NP    Discussed patient and plan with Dr. Provost Remy Lee MD  [facility], PGY-2

## (undated) DEVICE — GLOVE SENSICARE NEOPRENE 6.5

## (undated) DEVICE — KIT SURGICAL TURNOVER

## (undated) DEVICE — SUT 2/0 36IN COATED VICRYL

## (undated) DEVICE — GOWN POLY REINF BRTH SLV XL

## (undated) DEVICE — GLOVE SENSICARE PI GRN 6.5

## (undated) DEVICE — PAD PREP CUFFED NS 24X48IN

## (undated) DEVICE — GLOVE SIGNATURE ESSNTL LTX 6.5

## (undated) DEVICE — NDL SPINAL 22GA 3.5 IN QUINCKE

## (undated) DEVICE — PAD CURAD NONADH 3X4IN

## (undated) DEVICE — MANIFOLD 4 PORT

## (undated) DEVICE — CATH RED RUBBER LATEX 14F 16IN

## (undated) DEVICE — GLOVE SENSICARE PI GRN 7.5

## (undated) DEVICE — CONTAINER SPECIMEN OR STER 4OZ

## (undated) DEVICE — JELLY SURGILUBE LUBE PKT 3GM

## (undated) DEVICE — TRAY SKIN SCRUB WET PREMIUM

## (undated) DEVICE — BLADE TONGUE DEPRESSOR STRL

## (undated) DEVICE — BLADE SURG STAINLESS STEEL #11

## (undated) DEVICE — DRAPE UNDERBUTTOCKS PCH STRL

## (undated) DEVICE — SUT 2/0 30IN SILK BLK BRAI

## (undated) DEVICE — GLOVE PROTEXIS LTX MICRO  7

## (undated) DEVICE — DRAPE LEGGINGS CUFF 31X48IN

## (undated) DEVICE — SOL 9P NACL IRR PIC IL

## (undated) DEVICE — HEMOSTAT SURGICEL 2X3IN

## (undated) DEVICE — GLOVE SIGNATURE ESSNTL LTX 7

## (undated) DEVICE — YANKAUER FLEX NO VENT REG CAP

## (undated) DEVICE — GLOVE SIGNATURE ESSNTL LTX 6

## (undated) DEVICE — ELECTRODE BALL RED 5MM

## (undated) DEVICE — SOLIDIFIER BOTTLE 1500CC

## (undated) DEVICE — Device